# Patient Record
Sex: MALE | Race: OTHER | Employment: FULL TIME | URBAN - METROPOLITAN AREA
[De-identification: names, ages, dates, MRNs, and addresses within clinical notes are randomized per-mention and may not be internally consistent; named-entity substitution may affect disease eponyms.]

---

## 2023-12-24 ENCOUNTER — APPOINTMENT (EMERGENCY)
Dept: CT IMAGING | Facility: HOSPITAL | Age: 58
End: 2023-12-24

## 2023-12-24 ENCOUNTER — HOSPITAL ENCOUNTER (EMERGENCY)
Facility: HOSPITAL | Age: 58
End: 2023-12-25
Attending: EMERGENCY MEDICINE

## 2023-12-24 ENCOUNTER — APPOINTMENT (EMERGENCY)
Dept: RADIOLOGY | Facility: HOSPITAL | Age: 58
End: 2023-12-24

## 2023-12-24 DIAGNOSIS — E87.29 HIGH ANION GAP METABOLIC ACIDOSIS: ICD-10-CM

## 2023-12-24 DIAGNOSIS — E11.9 NEWLY DIAGNOSED DIABETES (HCC): ICD-10-CM

## 2023-12-24 DIAGNOSIS — N17.9 AKI (ACUTE KIDNEY INJURY) (HCC): ICD-10-CM

## 2023-12-24 DIAGNOSIS — E11.10 DKA (DIABETIC KETOACIDOSIS) (HCC): Primary | ICD-10-CM

## 2023-12-24 LAB
ALBUMIN SERPL BCP-MCNC: 4.3 G/DL (ref 3.5–5)
ALP SERPL-CCNC: 122 U/L (ref 34–104)
ALT SERPL W P-5'-P-CCNC: 24 U/L (ref 7–52)
ANION GAP SERPL CALCULATED.3IONS-SCNC: 27 MMOL/L
APTT PPP: 27 SECONDS (ref 23–37)
ARTERIAL PATENCY WRIST A: YES
AST SERPL W P-5'-P-CCNC: 9 U/L (ref 13–39)
BACTERIA UR QL AUTO: ABNORMAL /HPF
BASE EXCESS BLDA CALC-SCNC: -23.3 MMOL/L
BASOPHILS # BLD AUTO: 0.05 THOUSANDS/ÂΜL (ref 0–0.1)
BASOPHILS NFR BLD AUTO: 0 % (ref 0–1)
BETA-HYDROXYBUTYRATE: 6.1 MMOL/L
BILIRUB SERPL-MCNC: 0.75 MG/DL (ref 0.2–1)
BILIRUB UR QL STRIP: NEGATIVE
BNP SERPL-MCNC: 30 PG/ML (ref 0–100)
BUN SERPL-MCNC: 44 MG/DL (ref 5–25)
CALCIUM SERPL-MCNC: 9.5 MG/DL (ref 8.4–10.2)
CARDIAC TROPONIN I PNL SERPL HS: 6 NG/L
CHLORIDE SERPL-SCNC: 102 MMOL/L (ref 96–108)
CLARITY UR: CLEAR
CO2 SERPL-SCNC: 6 MMOL/L (ref 21–32)
COLOR UR: YELLOW
CREAT SERPL-MCNC: 1.99 MG/DL (ref 0.6–1.3)
EOSINOPHIL # BLD AUTO: 0 THOUSAND/ÂΜL (ref 0–0.61)
EOSINOPHIL NFR BLD AUTO: 0 % (ref 0–6)
ERYTHROCYTE [DISTWIDTH] IN BLOOD BY AUTOMATED COUNT: 14.2 % (ref 11.6–15.1)
FLUAV RNA RESP QL NAA+PROBE: NEGATIVE
FLUBV RNA RESP QL NAA+PROBE: NEGATIVE
GFR SERPL CREATININE-BSD FRML MDRD: 36 ML/MIN/1.73SQ M
GLUCOSE SERPL-MCNC: 665 MG/DL (ref 65–140)
GLUCOSE SERPL-MCNC: >500 MG/DL (ref 65–140)
GLUCOSE SERPL-MCNC: >500 MG/DL (ref 65–140)
GLUCOSE UR STRIP-MCNC: ABNORMAL MG/DL
HCO3 BLDA-SCNC: 3.5 MMOL/L (ref 22–28)
HCT VFR BLD AUTO: 56 % (ref 36.5–49.3)
HGB BLD-MCNC: 18.8 G/DL (ref 12–17)
HGB UR QL STRIP.AUTO: ABNORMAL
HYALINE CASTS #/AREA URNS LPF: ABNORMAL /LPF
IMM GRANULOCYTES # BLD AUTO: 0.11 THOUSAND/UL (ref 0–0.2)
IMM GRANULOCYTES NFR BLD AUTO: 1 % (ref 0–2)
INR PPP: 1.12 (ref 0.84–1.19)
KETONES UR STRIP-MCNC: ABNORMAL MG/DL
LACTATE SERPL-SCNC: 2.8 MMOL/L (ref 0.5–2)
LEUKOCYTE ESTERASE UR QL STRIP: NEGATIVE
LYMPHOCYTES # BLD AUTO: 1.24 THOUSANDS/ÂΜL (ref 0.6–4.47)
LYMPHOCYTES NFR BLD AUTO: 7 % (ref 14–44)
MAGNESIUM SERPL-MCNC: 2.9 MG/DL (ref 1.9–2.7)
MCH RBC QN AUTO: 30.3 PG (ref 26.8–34.3)
MCHC RBC AUTO-ENTMCNC: 33.6 G/DL (ref 31.4–37.4)
MCV RBC AUTO: 90 FL (ref 82–98)
MONOCYTES # BLD AUTO: 1.35 THOUSAND/ÂΜL (ref 0.17–1.22)
MONOCYTES NFR BLD AUTO: 8 % (ref 4–12)
NEUTROPHILS # BLD AUTO: 14.98 THOUSANDS/ÂΜL (ref 1.85–7.62)
NEUTS SEG NFR BLD AUTO: 84 % (ref 43–75)
NITRITE UR QL STRIP: NEGATIVE
NON VENT ROOM AIR: 21 %
NON-SQ EPI CELLS URNS QL MICRO: ABNORMAL /HPF
NRBC BLD AUTO-RTO: 0 /100 WBCS
O2 CT BLDA-SCNC: 24.7 ML/DL (ref 16–23)
OXYHGB MFR BLDA: 96.8 % (ref 94–97)
PCO2 BLDA: 11.2 MM HG (ref 36–44)
PH BLDA: 7.11 [PH] (ref 7.35–7.45)
PH UR STRIP.AUTO: 6 [PH]
PLATELET # BLD AUTO: 243 THOUSANDS/UL (ref 149–390)
PMV BLD AUTO: 12.3 FL (ref 8.9–12.7)
PO2 BLDA: 109 MM HG (ref 75–129)
POTASSIUM SERPL-SCNC: 4.7 MMOL/L (ref 3.5–5.3)
PROCALCITONIN SERPL-MCNC: 0.15 NG/ML
PROT SERPL-MCNC: 8.9 G/DL (ref 6.4–8.4)
PROT UR STRIP-MCNC: ABNORMAL MG/DL
PROTHROMBIN TIME: 14.3 SECONDS (ref 11.6–14.5)
RBC # BLD AUTO: 6.21 MILLION/UL (ref 3.88–5.62)
RBC #/AREA URNS AUTO: ABNORMAL /HPF
RSV RNA RESP QL NAA+PROBE: NEGATIVE
SARS-COV-2 RNA RESP QL NAA+PROBE: NEGATIVE
SODIUM SERPL-SCNC: 135 MMOL/L (ref 135–147)
SP GR UR STRIP.AUTO: 1.02 (ref 1–1.03)
SPECIMEN SOURCE: ABNORMAL
UROBILINOGEN UR QL STRIP.AUTO: 0.2 E.U./DL
WBC # BLD AUTO: 17.73 THOUSAND/UL (ref 4.31–10.16)
WBC #/AREA URNS AUTO: ABNORMAL /HPF

## 2023-12-24 PROCEDURE — 96365 THER/PROPH/DIAG IV INF INIT: CPT

## 2023-12-24 PROCEDURE — 84484 ASSAY OF TROPONIN QUANT: CPT | Performed by: EMERGENCY MEDICINE

## 2023-12-24 PROCEDURE — 84300 ASSAY OF URINE SODIUM: CPT | Performed by: EMERGENCY MEDICINE

## 2023-12-24 PROCEDURE — 83880 ASSAY OF NATRIURETIC PEPTIDE: CPT | Performed by: EMERGENCY MEDICINE

## 2023-12-24 PROCEDURE — 83935 ASSAY OF URINE OSMOLALITY: CPT | Performed by: EMERGENCY MEDICINE

## 2023-12-24 PROCEDURE — 36415 COLL VENOUS BLD VENIPUNCTURE: CPT | Performed by: EMERGENCY MEDICINE

## 2023-12-24 PROCEDURE — 85610 PROTHROMBIN TIME: CPT | Performed by: EMERGENCY MEDICINE

## 2023-12-24 PROCEDURE — 83735 ASSAY OF MAGNESIUM: CPT | Performed by: EMERGENCY MEDICINE

## 2023-12-24 PROCEDURE — 85730 THROMBOPLASTIN TIME PARTIAL: CPT | Performed by: EMERGENCY MEDICINE

## 2023-12-24 PROCEDURE — 99291 CRITICAL CARE FIRST HOUR: CPT | Performed by: EMERGENCY MEDICINE

## 2023-12-24 PROCEDURE — 96366 THER/PROPH/DIAG IV INF ADDON: CPT

## 2023-12-24 PROCEDURE — 36600 WITHDRAWAL OF ARTERIAL BLOOD: CPT

## 2023-12-24 PROCEDURE — 81001 URINALYSIS AUTO W/SCOPE: CPT | Performed by: EMERGENCY MEDICINE

## 2023-12-24 PROCEDURE — 81003 URINALYSIS AUTO W/O SCOPE: CPT | Performed by: EMERGENCY MEDICINE

## 2023-12-24 PROCEDURE — 0241U HB NFCT DS VIR RESP RNA 4 TRGT: CPT | Performed by: EMERGENCY MEDICINE

## 2023-12-24 PROCEDURE — 82948 REAGENT STRIP/BLOOD GLUCOSE: CPT

## 2023-12-24 PROCEDURE — 71045 X-RAY EXAM CHEST 1 VIEW: CPT

## 2023-12-24 PROCEDURE — 83930 ASSAY OF BLOOD OSMOLALITY: CPT | Performed by: EMERGENCY MEDICINE

## 2023-12-24 PROCEDURE — 96368 THER/DIAG CONCURRENT INF: CPT

## 2023-12-24 PROCEDURE — 80053 COMPREHEN METABOLIC PANEL: CPT | Performed by: EMERGENCY MEDICINE

## 2023-12-24 PROCEDURE — 85025 COMPLETE CBC W/AUTO DIFF WBC: CPT | Performed by: EMERGENCY MEDICINE

## 2023-12-24 PROCEDURE — 93005 ELECTROCARDIOGRAM TRACING: CPT

## 2023-12-24 PROCEDURE — 74177 CT ABD & PELVIS W/CONTRAST: CPT

## 2023-12-24 PROCEDURE — 83605 ASSAY OF LACTIC ACID: CPT | Performed by: EMERGENCY MEDICINE

## 2023-12-24 PROCEDURE — 82010 KETONE BODYS QUAN: CPT | Performed by: EMERGENCY MEDICINE

## 2023-12-24 PROCEDURE — 96361 HYDRATE IV INFUSION ADD-ON: CPT

## 2023-12-24 PROCEDURE — 99285 EMERGENCY DEPT VISIT HI MDM: CPT

## 2023-12-24 PROCEDURE — 82805 BLOOD GASES W/O2 SATURATION: CPT | Performed by: EMERGENCY MEDICINE

## 2023-12-24 PROCEDURE — G1004 CDSM NDSC: HCPCS

## 2023-12-24 PROCEDURE — 84145 PROCALCITONIN (PCT): CPT | Performed by: EMERGENCY MEDICINE

## 2023-12-24 RX ORDER — SODIUM CHLORIDE 9 MG/ML
200 INJECTION, SOLUTION INTRAVENOUS CONTINUOUS
Status: DISCONTINUED | OUTPATIENT
Start: 2023-12-24 | End: 2023-12-25 | Stop reason: HOSPADM

## 2023-12-24 RX ORDER — POTASSIUM CHLORIDE 14.9 MG/ML
20 INJECTION INTRAVENOUS
Qty: 200 ML | Refills: 0 | Status: DISCONTINUED | OUTPATIENT
Start: 2023-12-24 | End: 2023-12-25 | Stop reason: HOSPADM

## 2023-12-24 RX ADMIN — IOHEXOL 100 ML: 350 INJECTION, SOLUTION INTRAVENOUS at 23:09

## 2023-12-24 RX ADMIN — SODIUM CHLORIDE 1000 ML: 0.9 INJECTION, SOLUTION INTRAVENOUS at 22:04

## 2023-12-24 RX ADMIN — SODIUM CHLORIDE 54 ML: 9 INJECTION, SOLUTION INTRAVENOUS at 23:28

## 2023-12-24 RX ADMIN — SODIUM CHLORIDE 1000 ML: 0.9 INJECTION, SOLUTION INTRAVENOUS at 22:01

## 2023-12-24 RX ADMIN — INSULIN HUMAN 10 UNITS: 100 INJECTION, SOLUTION PARENTERAL at 22:59

## 2023-12-24 RX ADMIN — POTASSIUM CHLORIDE 20 MEQ: 14.9 INJECTION, SOLUTION INTRAVENOUS at 22:59

## 2023-12-24 RX ADMIN — SODIUM CHLORIDE 200 ML/HR: 0.9 INJECTION, SOLUTION INTRAVENOUS at 23:46

## 2023-12-24 RX ADMIN — SODIUM CHLORIDE 1000 ML: 0.9 INJECTION, SOLUTION INTRAVENOUS at 22:00

## 2023-12-24 RX ADMIN — SODIUM CHLORIDE 10.2 UNITS/HR: 9 INJECTION, SOLUTION INTRAVENOUS at 22:59

## 2023-12-25 ENCOUNTER — APPOINTMENT (INPATIENT)
Dept: RADIOLOGY | Facility: HOSPITAL | Age: 58
DRG: 638 | End: 2023-12-25

## 2023-12-25 ENCOUNTER — HOSPITAL ENCOUNTER (INPATIENT)
Facility: HOSPITAL | Age: 58
LOS: 4 days | Discharge: HOME | DRG: 638 | End: 2023-12-29
Attending: INTERNAL MEDICINE | Admitting: INTERNAL MEDICINE
Payer: COMMERCIAL

## 2023-12-25 VITALS
TEMPERATURE: 97.4 F | BODY MASS INDEX: 37.39 KG/M2 | OXYGEN SATURATION: 99 % | HEIGHT: 65 IN | DIASTOLIC BLOOD PRESSURE: 59 MMHG | SYSTOLIC BLOOD PRESSURE: 113 MMHG | HEART RATE: 90 BPM | RESPIRATION RATE: 28 BRPM | WEIGHT: 224.43 LBS

## 2023-12-25 DIAGNOSIS — E11.10 DKA (DIABETIC KETOACIDOSIS) (HCC): Primary | ICD-10-CM

## 2023-12-25 PROBLEM — R79.89 ELEVATED SERUM CREATININE: Status: ACTIVE | Noted: 2023-12-25

## 2023-12-25 PROBLEM — K59.00 CONSTIPATION: Status: ACTIVE | Noted: 2023-12-25

## 2023-12-25 PROBLEM — R93.89 ABNORMAL FINDING ON CT SCAN: Status: RESOLVED | Noted: 2023-12-25 | Resolved: 2023-12-25

## 2023-12-25 PROBLEM — R93.89 ABNORMAL FINDING ON CT SCAN: Status: ACTIVE | Noted: 2023-12-25

## 2023-12-25 PROBLEM — N17.9 AKI (ACUTE KIDNEY INJURY) (HCC): Status: ACTIVE | Noted: 2023-12-25

## 2023-12-25 LAB
2HR DELTA HS TROPONIN: -1 NG/L
ANION GAP SERPL CALCULATED.3IONS-SCNC: 14 MMOL/L
ANION GAP SERPL CALCULATED.3IONS-SCNC: 19 MMOL/L
ANION GAP SERPL CALCULATED.3IONS-SCNC: 22 MMOL/L
ANION GAP SERPL CALCULATED.3IONS-SCNC: 6 MMOL/L
ANION GAP SERPL CALCULATED.3IONS-SCNC: 6 MMOL/L
ANION GAP SERPL CALCULATED.3IONS-SCNC: 8 MMOL/L
BASE EX.OXY STD BLDV CALC-SCNC: 86.2 % (ref 60–80)
BASE EXCESS BLDV CALC-SCNC: -22.6 MMOL/L
BASOPHILS # BLD AUTO: 0.02 THOUSANDS/ÂΜL (ref 0–0.1)
BASOPHILS NFR BLD AUTO: 0 % (ref 0–1)
BUN SERPL-MCNC: 17 MG/DL (ref 5–25)
BUN SERPL-MCNC: 20 MG/DL (ref 5–25)
BUN SERPL-MCNC: 25 MG/DL (ref 5–25)
BUN SERPL-MCNC: 30 MG/DL (ref 5–25)
BUN SERPL-MCNC: 35 MG/DL (ref 5–25)
BUN SERPL-MCNC: 37 MG/DL (ref 5–25)
CA-I BLD-SCNC: 1.19 MMOL/L (ref 1.12–1.32)
CALCIUM SERPL-MCNC: 7 MG/DL (ref 8.4–10.2)
CALCIUM SERPL-MCNC: 7.2 MG/DL (ref 8.4–10.2)
CALCIUM SERPL-MCNC: 7.5 MG/DL (ref 8.4–10.2)
CALCIUM SERPL-MCNC: 7.6 MG/DL (ref 8.4–10.2)
CALCIUM SERPL-MCNC: 7.7 MG/DL (ref 8.4–10.2)
CALCIUM SERPL-MCNC: 7.9 MG/DL (ref 8.4–10.2)
CARDIAC TROPONIN I PNL SERPL HS: 5 NG/L
CHLORIDE SERPL-SCNC: 112 MMOL/L (ref 96–108)
CHLORIDE SERPL-SCNC: 119 MMOL/L (ref 96–108)
CHLORIDE SERPL-SCNC: 121 MMOL/L (ref 96–108)
CHLORIDE SERPL-SCNC: 122 MMOL/L (ref 96–108)
CHLORIDE SERPL-SCNC: 124 MMOL/L (ref 96–108)
CHLORIDE SERPL-SCNC: 124 MMOL/L (ref 96–108)
CO2 SERPL-SCNC: 11 MMOL/L (ref 21–32)
CO2 SERPL-SCNC: 16 MMOL/L (ref 21–32)
CO2 SERPL-SCNC: 18 MMOL/L (ref 21–32)
CO2 SERPL-SCNC: 18 MMOL/L (ref 21–32)
CO2 SERPL-SCNC: 5 MMOL/L (ref 21–32)
CO2 SERPL-SCNC: 6 MMOL/L (ref 21–32)
CREAT SERPL-MCNC: 0.69 MG/DL (ref 0.6–1.3)
CREAT SERPL-MCNC: 0.8 MG/DL (ref 0.6–1.3)
CREAT SERPL-MCNC: 0.92 MG/DL (ref 0.6–1.3)
CREAT SERPL-MCNC: 1.2 MG/DL (ref 0.6–1.3)
CREAT SERPL-MCNC: 1.41 MG/DL (ref 0.6–1.3)
CREAT SERPL-MCNC: 1.55 MG/DL (ref 0.6–1.3)
EOSINOPHIL # BLD AUTO: 0 THOUSAND/ÂΜL (ref 0–0.61)
EOSINOPHIL NFR BLD AUTO: 0 % (ref 0–6)
ERYTHROCYTE [DISTWIDTH] IN BLOOD BY AUTOMATED COUNT: 14 % (ref 11.6–15.1)
ERYTHROCYTE [DISTWIDTH] IN BLOOD BY AUTOMATED COUNT: 14.2 % (ref 11.6–15.1)
GFR SERPL CREATININE-BSD FRML MDRD: 105 ML/MIN/1.73SQ M
GFR SERPL CREATININE-BSD FRML MDRD: 48 ML/MIN/1.73SQ M
GFR SERPL CREATININE-BSD FRML MDRD: 54 ML/MIN/1.73SQ M
GFR SERPL CREATININE-BSD FRML MDRD: 66 ML/MIN/1.73SQ M
GFR SERPL CREATININE-BSD FRML MDRD: 91 ML/MIN/1.73SQ M
GFR SERPL CREATININE-BSD FRML MDRD: 99 ML/MIN/1.73SQ M
GLUCOSE SERPL-MCNC: 100 MG/DL (ref 65–140)
GLUCOSE SERPL-MCNC: 102 MG/DL (ref 65–140)
GLUCOSE SERPL-MCNC: 106 MG/DL (ref 65–140)
GLUCOSE SERPL-MCNC: 111 MG/DL (ref 65–140)
GLUCOSE SERPL-MCNC: 127 MG/DL (ref 65–140)
GLUCOSE SERPL-MCNC: 157 MG/DL (ref 65–140)
GLUCOSE SERPL-MCNC: 177 MG/DL (ref 65–140)
GLUCOSE SERPL-MCNC: 190 MG/DL (ref 65–140)
GLUCOSE SERPL-MCNC: 204 MG/DL (ref 65–140)
GLUCOSE SERPL-MCNC: 209 MG/DL (ref 65–140)
GLUCOSE SERPL-MCNC: 212 MG/DL (ref 65–140)
GLUCOSE SERPL-MCNC: 222 MG/DL (ref 65–140)
GLUCOSE SERPL-MCNC: 225 MG/DL (ref 65–140)
GLUCOSE SERPL-MCNC: 226 MG/DL (ref 65–140)
GLUCOSE SERPL-MCNC: 230 MG/DL (ref 65–140)
GLUCOSE SERPL-MCNC: 256 MG/DL (ref 65–140)
GLUCOSE SERPL-MCNC: 268 MG/DL (ref 65–140)
GLUCOSE SERPL-MCNC: 315 MG/DL (ref 65–140)
GLUCOSE SERPL-MCNC: 364 MG/DL (ref 65–140)
GLUCOSE SERPL-MCNC: 371 MG/DL (ref 65–140)
GLUCOSE SERPL-MCNC: 389 MG/DL (ref 65–140)
GLUCOSE SERPL-MCNC: 449 MG/DL (ref 65–140)
GLUCOSE SERPL-MCNC: 455 MG/DL (ref 65–140)
GLUCOSE SERPL-MCNC: 68 MG/DL (ref 65–140)
GLUCOSE SERPL-MCNC: 74 MG/DL (ref 65–140)
GLUCOSE SERPL-MCNC: 96 MG/DL (ref 65–140)
GLUCOSE SERPL-MCNC: 97 MG/DL (ref 65–140)
GLUCOSE SERPL-MCNC: 98 MG/DL (ref 65–140)
GLUCOSE SERPL-MCNC: 99 MG/DL (ref 65–140)
HCO3 BLDV-SCNC: 6.2 MMOL/L (ref 24–30)
HCT VFR BLD AUTO: 46.2 % (ref 36.5–49.3)
HCT VFR BLD AUTO: 47.9 % (ref 36.5–49.3)
HGB BLD-MCNC: 15.5 G/DL (ref 12–17)
HGB BLD-MCNC: 16.2 G/DL (ref 12–17)
IMM GRANULOCYTES # BLD AUTO: 0.07 THOUSAND/UL (ref 0–0.2)
IMM GRANULOCYTES NFR BLD AUTO: 1 % (ref 0–2)
LACTATE SERPL-SCNC: 1.8 MMOL/L (ref 0.5–2)
LACTATE SERPL-SCNC: 2.4 MMOL/L (ref 0.5–2)
LYMPHOCYTES # BLD AUTO: 1.27 THOUSANDS/ÂΜL (ref 0.6–4.47)
LYMPHOCYTES NFR BLD AUTO: 11 % (ref 14–44)
MAGNESIUM SERPL-MCNC: 1.9 MG/DL (ref 1.9–2.7)
MAGNESIUM SERPL-MCNC: 2.2 MG/DL (ref 1.9–2.7)
MAGNESIUM SERPL-MCNC: 2.4 MG/DL (ref 1.9–2.7)
MCH RBC QN AUTO: 30 PG (ref 26.8–34.3)
MCH RBC QN AUTO: 30.4 PG (ref 26.8–34.3)
MCHC RBC AUTO-ENTMCNC: 33.5 G/DL (ref 31.4–37.4)
MCHC RBC AUTO-ENTMCNC: 33.8 G/DL (ref 31.4–37.4)
MCV RBC AUTO: 90 FL (ref 82–98)
MCV RBC AUTO: 90 FL (ref 82–98)
MONOCYTES # BLD AUTO: 0.65 THOUSAND/ÂΜL (ref 0.17–1.22)
MONOCYTES NFR BLD AUTO: 5 % (ref 4–12)
NEUTROPHILS # BLD AUTO: 10.14 THOUSANDS/ÂΜL (ref 1.85–7.62)
NEUTS SEG NFR BLD AUTO: 83 % (ref 43–75)
NRBC BLD AUTO-RTO: 0 /100 WBCS
O2 CT BLDV-SCNC: 20.2 ML/DL
OSMOLALITY UR/SERPL-RTO: 367 MMOL/KG (ref 282–298)
OSMOLALITY UR: 640 MMOL/KG
PCO2 BLDV: 22.7 MM HG (ref 42–50)
PH BLDV: 7.05 [PH] (ref 7.3–7.4)
PHOSPHATE SERPL-MCNC: 1.2 MG/DL (ref 2.7–4.5)
PHOSPHATE SERPL-MCNC: 1.3 MG/DL (ref 2.7–4.5)
PHOSPHATE SERPL-MCNC: 1.3 MG/DL (ref 2.7–4.5)
PHOSPHATE SERPL-MCNC: 1.9 MG/DL (ref 2.7–4.5)
PHOSPHATE SERPL-MCNC: <1 MG/DL (ref 2.7–4.5)
PLATELET # BLD AUTO: 160 THOUSANDS/UL (ref 149–390)
PLATELET # BLD AUTO: 171 THOUSANDS/UL (ref 149–390)
PMV BLD AUTO: 12 FL (ref 8.9–12.7)
PMV BLD AUTO: 12.1 FL (ref 8.9–12.7)
PO2 BLDV: 61.2 MM HG (ref 35–45)
POTASSIUM SERPL-SCNC: 2.8 MMOL/L (ref 3.5–5.3)
POTASSIUM SERPL-SCNC: 2.9 MMOL/L (ref 3.5–5.3)
POTASSIUM SERPL-SCNC: 3.4 MMOL/L (ref 3.5–5.3)
POTASSIUM SERPL-SCNC: 3.5 MMOL/L (ref 3.5–5.3)
POTASSIUM SERPL-SCNC: 3.6 MMOL/L (ref 3.5–5.3)
POTASSIUM SERPL-SCNC: 3.8 MMOL/L (ref 3.5–5.3)
RBC # BLD AUTO: 5.16 MILLION/UL (ref 3.88–5.62)
RBC # BLD AUTO: 5.33 MILLION/UL (ref 3.88–5.62)
SODIUM 24H UR-SCNC: 49 MOL/L
SODIUM SERPL-SCNC: 139 MMOL/L (ref 135–147)
SODIUM SERPL-SCNC: 144 MMOL/L (ref 135–147)
SODIUM SERPL-SCNC: 146 MMOL/L (ref 135–147)
SODIUM SERPL-SCNC: 146 MMOL/L (ref 135–147)
SODIUM SERPL-SCNC: 148 MMOL/L (ref 135–147)
SODIUM SERPL-SCNC: 148 MMOL/L (ref 135–147)
WBC # BLD AUTO: 12.15 THOUSAND/UL (ref 4.31–10.16)
WBC # BLD AUTO: 15.04 THOUSAND/UL (ref 4.31–10.16)

## 2023-12-25 PROCEDURE — 84100 ASSAY OF PHOSPHORUS: CPT | Performed by: PHYSICIAN ASSISTANT

## 2023-12-25 PROCEDURE — 99223 1ST HOSP IP/OBS HIGH 75: CPT | Performed by: INTERNAL MEDICINE

## 2023-12-25 PROCEDURE — 82948 REAGENT STRIP/BLOOD GLUCOSE: CPT

## 2023-12-25 PROCEDURE — 83605 ASSAY OF LACTIC ACID: CPT | Performed by: EMERGENCY MEDICINE

## 2023-12-25 PROCEDURE — 82330 ASSAY OF CALCIUM: CPT | Performed by: PHYSICIAN ASSISTANT

## 2023-12-25 PROCEDURE — 80048 BASIC METABOLIC PNL TOTAL CA: CPT | Performed by: PHYSICIAN ASSISTANT

## 2023-12-25 PROCEDURE — 96367 TX/PROPH/DG ADDL SEQ IV INF: CPT

## 2023-12-25 PROCEDURE — 36415 COLL VENOUS BLD VENIPUNCTURE: CPT | Performed by: EMERGENCY MEDICINE

## 2023-12-25 PROCEDURE — 80048 BASIC METABOLIC PNL TOTAL CA: CPT | Performed by: EMERGENCY MEDICINE

## 2023-12-25 PROCEDURE — 99255 IP/OBS CONSLTJ NEW/EST HI 80: CPT | Performed by: INTERNAL MEDICINE

## 2023-12-25 PROCEDURE — 83036 HEMOGLOBIN GLYCOSYLATED A1C: CPT | Performed by: PHYSICIAN ASSISTANT

## 2023-12-25 PROCEDURE — 85025 COMPLETE CBC W/AUTO DIFF WBC: CPT | Performed by: PHYSICIAN ASSISTANT

## 2023-12-25 PROCEDURE — 85027 COMPLETE CBC AUTOMATED: CPT | Performed by: PHYSICIAN ASSISTANT

## 2023-12-25 PROCEDURE — 83605 ASSAY OF LACTIC ACID: CPT | Performed by: PHYSICIAN ASSISTANT

## 2023-12-25 PROCEDURE — 71045 X-RAY EXAM CHEST 1 VIEW: CPT

## 2023-12-25 PROCEDURE — 84484 ASSAY OF TROPONIN QUANT: CPT | Performed by: EMERGENCY MEDICINE

## 2023-12-25 PROCEDURE — 82805 BLOOD GASES W/O2 SATURATION: CPT | Performed by: PHYSICIAN ASSISTANT

## 2023-12-25 PROCEDURE — 87040 BLOOD CULTURE FOR BACTERIA: CPT | Performed by: PHYSICIAN ASSISTANT

## 2023-12-25 PROCEDURE — 83735 ASSAY OF MAGNESIUM: CPT | Performed by: PHYSICIAN ASSISTANT

## 2023-12-25 RX ORDER — INSULIN LISPRO 100 [IU]/ML
1-6 INJECTION, SOLUTION INTRAVENOUS; SUBCUTANEOUS EVERY 6 HOURS SCHEDULED
Status: DISCONTINUED | OUTPATIENT
Start: 2023-12-25 | End: 2023-12-26

## 2023-12-25 RX ORDER — SODIUM CHLORIDE, SODIUM GLUCONATE, SODIUM ACETATE, POTASSIUM CHLORIDE, MAGNESIUM CHLORIDE, SODIUM PHOSPHATE, DIBASIC, AND POTASSIUM PHOSPHATE .53; .5; .37; .037; .03; .012; .00082 G/100ML; G/100ML; G/100ML; G/100ML; G/100ML; G/100ML; G/100ML
250 INJECTION, SOLUTION INTRAVENOUS CONTINUOUS
Status: DISPENSED | OUTPATIENT
Start: 2023-12-25 | End: 2023-12-25

## 2023-12-25 RX ORDER — INSULIN LISPRO 100 [IU]/ML
10 INJECTION, SOLUTION INTRAVENOUS; SUBCUTANEOUS
Status: DISCONTINUED | OUTPATIENT
Start: 2023-12-25 | End: 2023-12-26

## 2023-12-25 RX ORDER — DEXTROSE, SODIUM CHLORIDE, SODIUM LACTATE, POTASSIUM CHLORIDE, AND CALCIUM CHLORIDE 5; .6; .31; .03; .02 G/100ML; G/100ML; G/100ML; G/100ML; G/100ML
250 INJECTION, SOLUTION INTRAVENOUS CONTINUOUS
Status: DISCONTINUED | OUTPATIENT
Start: 2023-12-25 | End: 2023-12-25

## 2023-12-25 RX ORDER — SODIUM CHLORIDE, SODIUM GLUCONATE, SODIUM ACETATE, POTASSIUM CHLORIDE, MAGNESIUM CHLORIDE, SODIUM PHOSPHATE, DIBASIC, AND POTASSIUM PHOSPHATE .53; .5; .37; .037; .03; .012; .00082 G/100ML; G/100ML; G/100ML; G/100ML; G/100ML; G/100ML; G/100ML
500 INJECTION, SOLUTION INTRAVENOUS CONTINUOUS
Status: DISPENSED | OUTPATIENT
Start: 2023-12-25 | End: 2023-12-25

## 2023-12-25 RX ORDER — POTASSIUM CHLORIDE 14.9 MG/ML
20 INJECTION INTRAVENOUS
Status: COMPLETED | OUTPATIENT
Start: 2023-12-25 | End: 2023-12-26

## 2023-12-25 RX ORDER — HEPARIN SODIUM 5000 [USP'U]/ML
5000 INJECTION, SOLUTION INTRAVENOUS; SUBCUTANEOUS EVERY 8 HOURS SCHEDULED
Status: DISCONTINUED | OUTPATIENT
Start: 2023-12-25 | End: 2023-12-29 | Stop reason: HOSPADM

## 2023-12-25 RX ORDER — DEXTROSE MONOHYDRATE 25 G/50ML
25 INJECTION, SOLUTION INTRAVENOUS ONCE
Status: COMPLETED | OUTPATIENT
Start: 2023-12-25 | End: 2023-12-25

## 2023-12-25 RX ORDER — ACETAMINOPHEN 325 MG/1
650 TABLET ORAL EVERY 6 HOURS PRN
Status: DISCONTINUED | OUTPATIENT
Start: 2023-12-25 | End: 2023-12-29 | Stop reason: HOSPADM

## 2023-12-25 RX ORDER — SENNOSIDES 8.6 MG
1 TABLET ORAL
Status: DISCONTINUED | OUTPATIENT
Start: 2023-12-25 | End: 2023-12-29 | Stop reason: HOSPADM

## 2023-12-25 RX ORDER — PIPERACILLIN SODIUM, TAZOBACTAM SODIUM 4; .5 G/20ML; G/20ML
INJECTION, POWDER, LYOPHILIZED, FOR SOLUTION INTRAVENOUS
Status: DISCONTINUED
Start: 2023-12-25 | End: 2023-12-25 | Stop reason: HOSPADM

## 2023-12-25 RX ORDER — MAGNESIUM SULFATE HEPTAHYDRATE 40 MG/ML
2 INJECTION, SOLUTION INTRAVENOUS ONCE
Status: COMPLETED | OUTPATIENT
Start: 2023-12-25 | End: 2023-12-26

## 2023-12-25 RX ORDER — POLYETHYLENE GLYCOL 3350 17 G/17G
17 POWDER, FOR SOLUTION ORAL DAILY PRN
Status: DISCONTINUED | OUTPATIENT
Start: 2023-12-25 | End: 2023-12-29 | Stop reason: HOSPADM

## 2023-12-25 RX ORDER — CHLORHEXIDINE GLUCONATE ORAL RINSE 1.2 MG/ML
15 SOLUTION DENTAL EVERY 12 HOURS SCHEDULED
Status: DISCONTINUED | OUTPATIENT
Start: 2023-12-25 | End: 2023-12-29 | Stop reason: HOSPADM

## 2023-12-25 RX ORDER — POTASSIUM CHLORIDE 14.9 MG/ML
20 INJECTION INTRAVENOUS
Status: COMPLETED | OUTPATIENT
Start: 2023-12-25 | End: 2023-12-25

## 2023-12-25 RX ADMIN — SODIUM CHLORIDE, SODIUM GLUCONATE, SODIUM ACETATE, POTASSIUM CHLORIDE, MAGNESIUM CHLORIDE, SODIUM PHOSPHATE, DIBASIC, AND POTASSIUM PHOSPHATE 500 ML/HR: .53; .5; .37; .037; .03; .012; .00082 INJECTION, SOLUTION INTRAVENOUS at 02:21

## 2023-12-25 RX ADMIN — POTASSIUM CHLORIDE 20 MEQ: 14.9 INJECTION, SOLUTION INTRAVENOUS at 10:08

## 2023-12-25 RX ADMIN — SODIUM CHLORIDE 30 UNITS/HR: 9 INJECTION, SOLUTION INTRAVENOUS at 11:57

## 2023-12-25 RX ADMIN — POTASSIUM PHOSPHATE, MONOBASIC POTASSIUM PHOSPHATE, DIBASIC 30 MMOL: 224; 236 INJECTION, SOLUTION, CONCENTRATE INTRAVENOUS at 09:40

## 2023-12-25 RX ADMIN — POTASSIUM CHLORIDE 20 MEQ: 14.9 INJECTION, SOLUTION INTRAVENOUS at 19:15

## 2023-12-25 RX ADMIN — SODIUM CHLORIDE 30 UNITS/HR: 9 INJECTION, SOLUTION INTRAVENOUS at 08:05

## 2023-12-25 RX ADMIN — INSULIN LISPRO 10 UNITS: 100 INJECTION, SOLUTION INTRAVENOUS; SUBCUTANEOUS at 12:50

## 2023-12-25 RX ADMIN — PIPERACILLIN SODIUM AND TAZOBACTAM SODIUM 4.5 G: 4; .5 INJECTION, POWDER, LYOPHILIZED, FOR SOLUTION INTRAVENOUS at 00:49

## 2023-12-25 RX ADMIN — CHLORHEXIDINE GLUCONATE 15 ML: 1.2 SOLUTION ORAL at 20:20

## 2023-12-25 RX ADMIN — POTASSIUM CHLORIDE 20 MEQ: 14.9 INJECTION, SOLUTION INTRAVENOUS at 16:18

## 2023-12-25 RX ADMIN — ACETAMINOPHEN 650 MG: 325 TABLET, FILM COATED ORAL at 19:59

## 2023-12-25 RX ADMIN — SODIUM CHLORIDE 15 UNITS/HR: 9 INJECTION, SOLUTION INTRAVENOUS at 15:43

## 2023-12-25 RX ADMIN — POTASSIUM CHLORIDE 20 MEQ: 14.9 INJECTION, SOLUTION INTRAVENOUS at 09:04

## 2023-12-25 RX ADMIN — POTASSIUM CHLORIDE 20 MEQ: 14.9 INJECTION, SOLUTION INTRAVENOUS at 11:57

## 2023-12-25 RX ADMIN — MAGNESIUM SULFATE HEPTAHYDRATE 2 G: 40 INJECTION, SOLUTION INTRAVENOUS at 14:11

## 2023-12-25 RX ADMIN — HEPARIN SODIUM 5000 UNITS: 5000 INJECTION INTRAVENOUS; SUBCUTANEOUS at 14:11

## 2023-12-25 RX ADMIN — DEXTROSE, SODIUM CHLORIDE, SODIUM LACTATE, POTASSIUM CHLORIDE, AND CALCIUM CHLORIDE 250 ML/HR: 5; .6; .31; .03; .02 INJECTION, SOLUTION INTRAVENOUS at 06:16

## 2023-12-25 RX ADMIN — POTASSIUM PHOSPHATE, MONOBASIC POTASSIUM PHOSPHATE, DIBASIC 30 MMOL: 224; 236 INJECTION, SOLUTION, CONCENTRATE INTRAVENOUS at 15:17

## 2023-12-25 RX ADMIN — INSULIN LISPRO 2 UNITS: 100 INJECTION, SOLUTION INTRAVENOUS; SUBCUTANEOUS at 23:22

## 2023-12-25 RX ADMIN — DEXTROSE, SODIUM CHLORIDE, SODIUM LACTATE, POTASSIUM CHLORIDE, AND CALCIUM CHLORIDE 250 ML/HR: 5; .6; .31; .03; .02 INJECTION, SOLUTION INTRAVENOUS at 15:14

## 2023-12-25 RX ADMIN — POTASSIUM CHLORIDE 20 MEQ: 14.9 INJECTION, SOLUTION INTRAVENOUS at 20:20

## 2023-12-25 RX ADMIN — DEXTROSE, SODIUM CHLORIDE, SODIUM LACTATE, POTASSIUM CHLORIDE, AND CALCIUM CHLORIDE 250 ML/HR: 5; .6; .31; .03; .02 INJECTION, SOLUTION INTRAVENOUS at 19:15

## 2023-12-25 RX ADMIN — POTASSIUM CHLORIDE 20 MEQ: 14.9 INJECTION, SOLUTION INTRAVENOUS at 15:12

## 2023-12-25 RX ADMIN — SODIUM BICARBONATE 125 ML/HR: 84 INJECTION, SOLUTION INTRAVENOUS at 03:32

## 2023-12-25 RX ADMIN — HEPARIN SODIUM 5000 UNITS: 5000 INJECTION INTRAVENOUS; SUBCUTANEOUS at 05:17

## 2023-12-25 RX ADMIN — POTASSIUM CHLORIDE 20 MEQ: 14.9 INJECTION, SOLUTION INTRAVENOUS at 01:06

## 2023-12-25 RX ADMIN — CHLORHEXIDINE GLUCONATE 15 ML: 1.2 SOLUTION ORAL at 08:05

## 2023-12-25 RX ADMIN — POTASSIUM PHOSPHATE, MONOBASIC AND POTASSIUM PHOSPHATE, DIBASIC 30 MMOL: 224; 236 INJECTION, SOLUTION, CONCENTRATE INTRAVENOUS at 03:55

## 2023-12-25 RX ADMIN — SODIUM CHLORIDE 30 UNITS/HR: 9 INJECTION, SOLUTION INTRAVENOUS at 05:00

## 2023-12-25 RX ADMIN — DEXTROSE, SODIUM CHLORIDE, SODIUM LACTATE, POTASSIUM CHLORIDE, AND CALCIUM CHLORIDE 250 ML/HR: 5; .6; .31; .03; .02 INJECTION, SOLUTION INTRAVENOUS at 11:14

## 2023-12-25 RX ADMIN — POTASSIUM CHLORIDE 20 MEQ: 14.9 INJECTION, SOLUTION INTRAVENOUS at 14:11

## 2023-12-25 RX ADMIN — DEXTROSE MONOHYDRATE 25 ML: 25 INJECTION, SOLUTION INTRAVENOUS at 16:04

## 2023-12-25 NOTE — QUICK NOTE
Patient was seen and examined at bedside this morning. He states that he is feeling much better now compared to when he first came into the hospital. He still endorses having some nausea. He mentions coughing up phlegm with some streaks of blood. He also says that he is constipated, last bowel movement was 4 days ago. No abdominal pain or tenderness on examination. Shortness of breath has resolved. He is visiting from [place] and does not have insurance. We will give prn Tigan due to QTc prolongation 495. We will also order CXR to compare to yesterday. Once his anion gap closes on 2 x BMP, he will be able to eat, drink, and get bowel regimen with Miralax and Senna. Updated wife at bedside.

## 2023-12-25 NOTE — CONSULTS
Endocrinology Consult Note  Praful Quiros 58 y.o. Male MRN: [id number]  Unit/Bed: ICU 12/ICU 12  Encounter: [id number]    CC: Newly-diagnosed diabetes mellitus with DKA     Assessment/Plan   Assessment:  Praful Quiros is a 58-year-old Tongan-speaking male with a past medical history of perforated appendicitis who presents to the ED with DKA and newly diagnosed type 2 diabetes mellitus.    Plan:  Newly-diagnosed diabetes mellitus with DKA   - A1c: Pending  - Outpatient regimen: None  - Continue insulin infusion at this time  - Will add Humalog 10 units 3 times daily to lower hourly insulin requirements  - Most recent BMP was obtained while patient was on a bicarbonate drip, continue to trend at this time per protocol  - Continue fingerstick blood glucose checks  - Avoid hypoglycemia and treat per protocol  - Given his presentation with DKA, would like to obtain antibody panel to rule out type 1 diabetes versus KIARA.  However, since patient is returning to [place] in the first week of January, this will be completed in [place].  - Discharge recommendations to follow pending clinical course; patient's wife was advised that he may benefit from ReliOn insulin given his lack of health insurance coverage and that he will need to check his blood sugars at least 4 times a day at home.  She will use her daughter's glucometer while he is in the  and will purchase a new one in [place].  Patient's wife was also advised to schedule an appointment with his PCP upon return to [place] for further titration of his insulin and antibody testing.  He should also see ophthalmology for a retinopathy screen. This information will be provided in the discharge summary.  - Endocrinology will continue to follow and make further recommendations and changes as appropriate      History of Present Illness   HPI:  Praful Quiros is a male with a past medical history of perforated appendicitis with a complicated postoperative  "lyn who is presented to the [facility] ED with complaints of weakness, fatigue, anxiety, vomiting, and nausea for the past few days and was subsequently transferred to the [facility] for further management of his DKA.  Patient somnolent at the time of my evaluation, therefore history was obtained from his wife at bedside.   [id number] used during this conversation.    Patient is originally from [place] and is visiting family locally with plans to return in the first week of January 2024. Patient's wife denies any known past medical history of diabetes in the patient.  He does not take any medications.  Patient's wife reports that he had been endorsing polydipsia, polyuria, and \"feeling bad\" for the past 1 month with progressively worsening symptoms.  Initially, his family thought that he had bronchitis versus COVID and observed him until yesterday when   He does not check his blood glucose at home and does not follow a diet controlled.  In addition to the polydipsia and polyuria, he endorses blurry vision and paresthesias. He has a family physician in [place] who he sees regularly. He does not have health insurance. He has a family history of type 2 diabetes in his mother and father.  He denies tobacco or illicit drug use but endorses drinking beer twice a week.  He works as a .      Inpatient consult to Endocrinology  Consult performed by: Oly Wing DO  Consult ordered by: Alfredo Saini MD        Review of Systems   Constitutional:  Positive for fatigue. Negative for appetite change, chills, fever and unexpected weight change.   HENT:  Negative for ear pain and sore throat.    Eyes:  Positive for visual disturbance. Negative for pain.   Respiratory:  Negative for cough and shortness of breath.    Cardiovascular:  Negative for chest pain and palpitations.   Gastrointestinal:  Positive for constipation, nausea and vomiting. Negative for abdominal pain and diarrhea. "   Endocrine: Positive for polydipsia and polyuria. Negative for polyphagia.   Genitourinary:  Negative for dysuria and hematuria.   Musculoskeletal:  Negative for arthralgias and back pain.   Skin:  Negative for color change and rash.   Neurological:  Positive for weakness and numbness. Negative for seizures and syncope.   Psychiatric/Behavioral:  The patient is nervous/anxious.    All other systems reviewed and are negative.      Historical Information   History reviewed. No pertinent past medical history.  Past Surgical History:   Procedure Laterality Date    ABDOMINAL SURGERY      APPENDECTOMY       Social History   Social History     Substance and Sexual Activity   Alcohol Use Yes    Comment: social     Social History     Substance and Sexual Activity   Drug Use Not Currently     Social History     Tobacco Use   Smoking Status Never    Passive exposure: Never   Smokeless Tobacco Never     Family History: History reviewed. No pertinent family history.    Meds/Allergies   Current Facility-Administered Medications   Medication Dose Route Frequency Provider Last Rate Last Admin    chlorhexidine (PERIDEX) 0.12 % oral rinse 15 mL  15 mL Mouth/Throat Q12H MELO Fang PA-C   15 mL at 12/25/23 0805    dextrose 5 % in lactated Ringer's infusion  250 mL/hr Intravenous Continuous Laxmi Fang PA-C 250 mL/hr at 12/25/23 1114 250 mL/hr at 12/25/23 1114    heparin (porcine) subcutaneous injection 5,000 Units  5,000 Units Subcutaneous Q8H [facility] Laxmi Fang PA-C   5,000 Units at 12/25/23 0517    insulin lispro (HumaLOG) 100 units/mL subcutaneous injection 10 Units  10 Units Subcutaneous TID With Meals Oly Mecca, DO        insulin regular (HumuLIN R,NovoLIN R) 1 Units/mL in sodium chloride 0.9 % 100 mL infusion  0.1-30 Units/hr Intravenous Continuous Laxmi Fang PA-C 30 mL/hr at 12/25/23 1157 30 Units/hr at 12/25/23 1157    multi-electrolyte (ISOLYTE-S PH 7.4 equivalent) IV solution  250 mL/hr  "Intravenous Continuous Laxmi Fang PA-C   Held at 12/25/23 0600    polyethylene glycol (MIRALAX) packet 17 g  17 g Oral Daily PRN Shreyan Harper, DO        potassium chloride 20 mEq IVPB (premix)  20 mEq Intravenous Q1H Alfred Dent, CRNP   20 mEq at 12/25/23 1157    potassium phosphates 30 mmol in sodium chloride 0.9 % 250 mL infusion  30 mmol Intravenous Once Alfred Dent, CRNP 41.7 mL/hr at 12/25/23 0940 30 mmol at 12/25/23 0940    senna (SENOKOT) tablet 8.6 mg  1 tablet Oral HS PRN Shreyan Harper, DO        trimethobenzamide (TIGAN) IM injection 200 mg  200 mg Intramuscular Q6H PRN Shreyan Harper, DO         No Known Allergies    Objective   Vitals:   Blood pressure 126/64, pulse 79, temperature 98.9 °F (37.2 °C), temperature source Axillary, resp. rate 13, height 5' 5\" (1.651 m), weight 102 kg (224 lb 6.9 oz), SpO2 97%.    Intake/Output Summary (Last 24 hours) at 12/25/2023 1244  Last data filed at 12/25/2023 1157  Gross per 24 hour   Intake 2915.02 ml   Output 1250 ml   Net 1665.02 ml     Invasive Devices       Peripheral Intravenous Line  Duration             Peripheral IV 12/24/23 Left Antecubital <1 day    Peripheral IV 12/24/23 Right Antecubital <1 day                    Physical Exam  Vitals reviewed.   Constitutional:       Appearance: Normal appearance.      Comments: Sleeping but arousable to voice   HENT:      Head: Normocephalic and atraumatic.      Mouth/Throat:      Mouth: Mucous membranes are moist.      Pharynx: Oropharynx is clear.   Eyes:      Extraocular Movements: Extraocular movements intact.      Pupils: Pupils are equal, round, and reactive to light.   Cardiovascular:      Rate and Rhythm: Normal rate and regular rhythm.      Pulses: Normal pulses.      Heart sounds: Normal heart sounds.   Pulmonary:      Effort: Pulmonary effort is normal.      Breath sounds: Normal breath sounds. No wheezing, rhonchi or rales.   Abdominal:      General: Abdomen is flat. Bowel sounds are " "normal. There is no distension.      Tenderness: There is no abdominal tenderness.   Musculoskeletal:         General: No deformity. Normal range of motion.      Cervical back: Normal range of motion and neck supple. No tenderness.      Right lower leg: No edema.      Left lower leg: No edema.   Skin:     General: Skin is warm and dry.   Neurological:      General: No focal deficit present.      Mental Status: He is alert.       The history was obtained from the review of the chart, family.    Lab Results:       Lab Results   Component Value Date    WBC 12.15 (H) 12/25/2023    HGB 15.5 12/25/2023    HCT 46.2 12/25/2023    MCV 90 12/25/2023     12/25/2023     Lab Results   Component Value Date/Time    BUN 25 12/25/2023 09:34 AM    K 3.4 (L) 12/25/2023 09:34 AM     (H) 12/25/2023 09:34 AM    CO2 16 (L) 12/25/2023 09:34 AM    CREATININE 0.92 12/25/2023 09:34 AM    AST 9 (L) 12/24/2023 09:52 PM    ALT 24 12/24/2023 09:52 PM    TP 8.9 (H) 12/24/2023 09:52 PM    ALB 4.3 12/24/2023 09:52 PM     No results for input(s): \"CHOL\", \"HDL\", \"LDL\", \"TRIG\", \"VLDL\" in the last 72 hours.  No results found for: \"MICROALBUR\", \"WCUI28QRU\"  POC Glucose (mg/dl)   Date Value   12/25/2023 177 (H)   12/25/2023 209 (H)       Imaging Studies: I have personally reviewed pertinent reports.      Portions of the record may have been created with voice recognition software.     Please TigerText questions to the clinician covering the \"[id number]-Hswa\" Role. Thank you.  "

## 2023-12-25 NOTE — ASSESSMENT & PLAN NOTE
"No results found for: \"HGBA1C\"    Recent Labs     12/24/23  2307 12/24/23  2359 12/25/23  0109 12/25/23  0204   POCGLU >500* 449* 389* 371*       Blood Sugar Average: Last 72 hrs:  (P) 371    Patient presented with 3 days of worsening fatigue with nausea   Previously undiagnosed diabetic  Traveled from [place] to visit family from holidays  Presented to [facility] [place] ED with increased respiratory rate and BGL > 500  Initiated on and received 3 liters of normal saline before transfer   Placed on DKA protocol   Reassess labs   Q1H fingerstick glucose checks    "

## 2023-12-25 NOTE — ASSESSMENT & PLAN NOTE
Cr 1.99 without established patient baseline  Received 3 L of normal saline before arrival on the ICU  Will reassess Cr with labs  Monitor I&Os

## 2023-12-25 NOTE — ED CARE HANDOFF
Emergency Department Sign Out Note        Sign out and transfer of care from Dr. Philip. See Separate Emergency Department note.     The patient, Praful Quiros, was evaluated by the previous provider for dyspnea x 3 days, with decreased appetite, chills, chest pain, N/V and a near syncopal episode tonight.    Workup Completed:  Initial ER evaluation by Dr. Philip, labs and imaging ordered, see separate ED provider note    ED Course / Workup Pending (followup):                                    ED Course as of 12/25/23 0031   Sun Dec 24, 2023   2324 SO: 58 y M. Prydeinig speaking, here from [place] visiting family. New onset DKA, diabetes, NEERAJ. Dehydration. Accepted to [facility] ICU, Dr. Gates.   Mon Dec 25, 2023   0000 Discussed with radiology on call, Dr. Sandeep Restrepo. CT is limited by motion artifact, appendix is prominent with possible slight inflammation but may also be related to motion artifact.  In the setting of benign abdominal exam without focal tenderness, would favor this being a result of the motion artifact rather than early appendicitis.   0016 CT abdomen pelvis with contrast  IMPRESSION:  Motion-limited study.  Prominent appendix is seen measuring up to 7 mm. There is the suggestion of trace periappendiceal inflammatory changes, limited evaluation secondary to significant motion. Early/developing appendicitis cannot be entirely excluded.  Right testis incidentally noted in the inguinal canal.  Findings discussed with Dr. Luis Felipe Keller at 12:06 AM on 12/25/2023.     Procedures  Medical Decision Making  This is a 58-year-old male with no documented past medical history presenting for evaluation of dyspnea x 3 days followed by an episode of near syncope today with additional symptoms consisting of decreased appetite, nausea and vomiting, abdominal pain, headache, chills, and chest pain.  Patient was initially seen and evaluated by Dr. Philip, and I received the patient in signout at end of shift  prior to final transfer.  Patient was found to be markedly hyperglycemic, dehydrated, with NEERAJ and suspected new onset diabetes and DKA.  DKA treatment initiated including potassium repletion, IV fluids, and insulin.  The patient was accepted to [facility] (ICU, Dr. Gates), EMTALA completed by Dr. Philip and signed by patient and the patient was signed over to me at the end of Dr. Philip's shift prior to transfer by ambulance to [facility]. Patient's rectal temperature was 94.9 °F, MANDEEP hugger warming initiated prior to transfer. Discussed with radiology on call, Dr. Sandeep Restrepo. CT is limited by motion artifact, appendix is prominent with possible slight inflammation but may also be related to motion artifact.  In the setting of benign abdominal exam without focal tenderness, would favor this being a result of the motion artifact rather than early appendicitis.  I discussed these findings with Critical Care at [facility], Dr. Gates, and will start IV antibiotics and plan for general surgery consult at [facility]. IV Zosyn ordered.    Amount and/or Complexity of Data Reviewed  Labs: ordered.  Radiology: ordered. Decision-making details documented in ED Course.    Risk  OTC drugs.  Prescription drug management.          Disposition  Final diagnoses:   DKA (diabetic ketoacidosis) ([facility])   Newly diagnosed diabetes (HCC)   NEERAJ (acute kidney injury) ([facility])   High anion gap metabolic acidosis     Time reflects when diagnosis was documented in both MDM as applicable and the Disposition within this note       Time User Action Codes Description Comment    12/24/2023 10:31 PM Lito Philip [E11.10] DKA (diabetic ketoacidosis) ([facility])     12/24/2023 10:31 PM Lito Philip Add [E10.9] New onset of diabetes mellitus in pediatric patient (HCC)     12/24/2023 10:31 PM Lito Philip Remove [E10.9] New onset of diabetes mellitus in pediatric patient ([facility])     12/24/2023 10:31 PM Lito Philip  [E11.9] Newly diagnosed diabetes ([facility])     12/24/2023 10:31 PM Lito Philip [N17.9] NEERAJ (acute kidney injury) ([facility])     12/24/2023 10:31 PM Lito Philip [E87.29] High anion gap metabolic acidosis           ED Disposition       ED Disposition   Transfer to Another Facility-In Network    Condition   --    Date/Time   Sun Dec 24, 2023 11:00 PM    Comment   Praful Quiros should be transferred out to [facility], accepted by Dr. Bender.               MD Documentation      Flowsheet Row Most Recent Value   Patient Condition The patient has been stabilized such that within reasonable medical probability, no material deterioration of the patient condition or the condition of the unborn child(ayo) is likely to result from the transfer   Reason for Transfer Level of Care needed not available at this facility   Benefits of Transfer Specialized equipment and/or services available at the receiving facility (Include comment)________________________   Risks of Transfer Potential for delay in receiving treatment, Potential deterioration of medical condition   Accepting Physician Dr. Bender   Sending MD Lito Philip MD          RN Documentation      Flowsheet Row Most Recent Value   Transport Mode Ambulance   Level of Care CCT-Nurse          Follow-up Information    None       Patient's Medications    No medications on file     No discharge procedures on file.       ED Provider  Electronically Signed by     Luis Felipe Keller MD  12/25/23 0031       Luis Felipe Keller MD  12/25/23 0031

## 2023-12-25 NOTE — H&P
"[facility]  H&P  Name: Praful Quiros 58 y.o. male I MRN: [id number]  Unit/Bed#: ICU 12 I Date of Admission: 12/25/2023   Date of Service: 12/25/2023 I Hospital Day: 0      Assessment/Plan   * DKA (diabetic ketoacidosis) ([facility])  Assessment & Plan  No results found for: \"HGBA1C\"    Recent Labs     12/24/23  2307 12/24/23  2359 12/25/23  0109 12/25/23  0204   POCGLU >500* 449* 389* 371*       Blood Sugar Average: Last 72 hrs:  (P) 371    Patient presented with 3 days of worsening fatigue with nausea   Previously undiagnosed diabetic  Traveled from [place] to visit family from holidays  Presented to [place] ED with increased respiratory rate and BGL > 500  Initiated on and received 3 liters of normal saline before transfer   Placed on DKA protocol   Reassess labs   Q1H fingerstick glucose checks      NEERAJ (acute kidney injury) ([facility])  Assessment & Plan  Cr 1.99 without established patient baseline  Received 3 L of normal saline before arrival on the ICU  Will reassess Cr with labs  Monitor I&Os           History of Present Illness     HPI: Praful Quiros is a 58 y.o. with a PMHx of perforated appendicitis s/p appendectomy with complications involving colectomy and reversal 8 years ago but otherwise no unremarkable medical history who presents with hyperglycemia and NEERAJ from [facility].  Patient presented to the emergency department with a complaint of nausea and vomiting with increased respirations and fatigue.  His family reported that he arrived from [place] for the holidays 4 days ago and \"felt fine\".  Over the last 3 days he began to have decreased oral intake with no bowel movements.  They reported increased urination frequency and chills with increased fatigue.  Yesterday afternoon he began to develop nausea and one episode of non bilious, non bloody emesis as well as increased respiration rate.  An extended family member, Dr. Bryson Allred, a hospitalist at [facility] in " [place], arrived at the family meal and noted his increased respirations and instructed him to go to the ED.  She assisted with translation at bedside via telephone.  He has no known history of diabetes.  He still reports chills but denies headache, change in vision, lightheadedness, chest pain, shortness of breath, abdominal pain, diarrhea, numbness, tingling, weakness, hematuria, dysuria, edema, rash or fever.    History obtained from chart review, the patient, and extended family via translation.  Review of Systems   Constitutional:  Positive for appetite change, chills and fatigue. Negative for fever.   HENT:  Negative for congestion, ear pain, rhinorrhea and sore throat.    Eyes:  Negative for photophobia, pain and visual disturbance.   Respiratory:  Negative for cough, shortness of breath, wheezing and stridor.    Cardiovascular:  Negative for chest pain, palpitations and leg swelling.   Gastrointestinal:  Positive for nausea and vomiting. Negative for abdominal distention, abdominal pain and diarrhea.   Endocrine: Negative.    Genitourinary:  Positive for frequency. Negative for dysuria and hematuria.   Musculoskeletal:  Negative for arthralgias, back pain, neck pain and neck stiffness.   Skin:  Negative for color change and rash.   Allergic/Immunologic: Negative.    Neurological:  Negative for dizziness, seizures, syncope, weakness, light-headedness, numbness and headaches.   Hematological: Negative.    Psychiatric/Behavioral: Negative.     All other systems reviewed and are negative.    Disposition: Stepdown Level 1  Historical Information   No past medical history on file. Past Surgical History:  No date: ABDOMINAL SURGERY  No date: APPENDECTOMY   No current outpatient medications No Known Allergies   Social History     Tobacco Use    Smoking status: Never     Passive exposure: Never    Smokeless tobacco: Never   Vaping Use    Vaping status: Former   Substance Use Topics    Alcohol use: Yes     Comment:  social    Drug use: Not Currently    History reviewed. No pertinent family history.       Objective                            Vitals I/O      Most Recent Min/Max in 24hrs   Temp 97.6 °F (36.4 °C) Temp  Min: 94.9 °F (34.9 °C)  Max: 97.6 °F (36.4 °C)   Pulse 88 Pulse  Min: 86  Max: 100   Resp 22 Resp  Min: 20  Max: 42   /73 BP  Min: 113/59  Max: 158/77   O2 Sat 99 % SpO2  Min: 98 %  Max: 99 %    No intake or output data in the 24 hours ending 12/25/23 0234    Diet NPO    Invasive Monitoring           Physical Exam   Physical Exam  Vitals and nursing note reviewed.   Eyes:      Extraocular Movements: Extraocular movements intact.      Conjunctiva/sclera: Conjunctivae normal.      Pupils: Pupils are equal, round, and reactive to light.   Skin:     General: Skin is warm and not mottled extremities.      Coloration: Skin is not jaundiced or pale.      Findings: No rash or wound.   HENT:      Head: Normocephalic and atraumatic.      Nose: No rhinorrhea.      Mouth/Throat:      Mouth: Mucous membranes are moist.      Pharynx: No oropharyngeal exudate.   Neck:      Vascular: No JVD.   no midline tenderness Cardiovascular:      Rate and Rhythm: Regular rhythm. Tachycardia present.      Pulses: Normal pulses.      Heart sounds: Normal heart sounds.   Musculoskeletal:         General: No swelling or tenderness. Normal range of motion.      Right lower leg: No edema.      Left lower leg: No edema.   Abdominal: General: There is no distension.      Palpations: Abdomen is soft.      Tenderness: There is no abdominal tenderness. There is no guarding.   Constitutional:       General: He is not in acute distress.     Appearance: He is well-developed and well-nourished. He is ill-appearing.      Interventions: He is not sedated and not intubated.  Pulmonary:      Effort: Pulmonary effort is normal. No accessory muscle usage, respiratory distress or accessory muscle usage. He is not intubated.      Breath sounds: Normal breath  sounds. No stridor. No wheezing, rhonchi or rales.   Chest:      Chest wall: No tenderness.   Psychiatric:         Speech: Speech is not no expressive aphasia.   Neurological:      General: No focal deficit present.      Mental Status: He is alert and oriented to person, place and time. Mental status is at baseline. He is calm.      Cranial Nerves: No dysarthria or facial asymmetry.      Sensory: Sensation is intact.      Motor: gross motor function is at baseline for patient. Strength full and intact in all extremities. No motor deficit.            Diagnostic Studies      EKG: None  Imaging:  I have personally reviewed pertinent reports.   and I have personally reviewed pertinent films in PACS     Medications:  Scheduled PRN   chlorhexidine, 15 mL, Q12H MELO  heparin (porcine), 5,000 Units, Q8H MELO          Continuous    dextrose 5% lactated ringer's, 250 mL/hr  insulin regular (HumuLIN R,NovoLIN R) 1 Units/mL in sodium chloride 0.9 % 100 mL infusion, 0.1-30 Units/hr, Last Rate: 20.4 Units/hr (12/25/23 0206)  multi-electrolyte, 500 mL/hr, Last Rate: 500 mL/hr (12/25/23 0221)   Followed by  multi-electrolyte, 250 mL/hr         Labs:    CBC    Recent Labs     12/24/23 2156   WBC 17.73*   HGB 18.8*   HCT 56.0*        BMP    Recent Labs     12/24/23 2152 12/25/23  0000   SODIUM 135 139   K 4.7 3.6    112*   CO2 6* 5*   AGAP 27 22   BUN 44* 37*   CREATININE 1.99* 1.55*   CALCIUM 9.5 7.9*       Coags    Recent Labs     12/24/23 2152   INR 1.12   PTT 27        Additional Electrolytes  Recent Labs     12/24/23 2152   MG 2.9*          Blood Gas    Recent Labs     12/24/23 2202   PHART 7.107*   APH6OVT 11.2*   PO2ART 109.0   [id number] 3.5*   BEART -23.3   SOURCE Radial, Right     Recent Labs     12/24/23 2202   SOURCE Radial, Right    LFTs  Recent Labs     12/24/23 2152   ALT 24   AST 9*   ALKPHOS 122*   ALB 4.3   TBILI 0.75       Infectious  Recent Labs     12/24/23 2152   PROCALCITONI 0.15      Glucose  Recent Labs     12/24/23  2152 12/25/23  0000   GLUC 665* 455*             Critical Care Time Delivered : Upon my evaluation, this patient had a high probability of imminent or life-threatening deterioration due to DKA, which required my direct attention, intervention, and personal management.  I have personally provided 30 minutes of critical care time, exclusive of procedures, teaching, family meetings, and any prior time recorded by providers other than myself.   Anticipated Length of Stay is > 2 midnights  Sanju Bentley, DO

## 2023-12-25 NOTE — ED PROVIDER NOTES
History  Chief Complaint   Patient presents with    Syncope     Arrived from [place] Thursday, loss of appetite, nausea, vomiting, headaches since yesterday, tachypneic, chills, chest pain     58-year-old male with no past medical history presents today with 3 days of shortness of breath, today felt an episode of near syncope, the patient has felt dizzy, with decreased appetite, nausea and vomiting, abdominal pain, headache, chills, chest pain, the patient is visiting from [place], has no other reported medical history, previously had an appendectomy, and the patient has family that is visiting with him from [place] who is an internal medicine physician at [employer], who reports that he also has not had a bowel movement 3 days.  The patient has had no cough, fevers, diarrhea, dysuria, hematuria, reports that his abdominal pain is periumbilical, and his chest pain is midsternal and does not radiate.        None       History reviewed. No pertinent past medical history.    Past Surgical History:   Procedure Laterality Date    ABDOMINAL SURGERY      APPENDECTOMY         History reviewed. No pertinent family history.  I have reviewed and agree with the history as documented.    E-Cigarette/Vaping    E-Cigarette Use Former User      E-Cigarette/Vaping Substances     Social History     Tobacco Use    Smoking status: Never     Passive exposure: Never    Smokeless tobacco: Never   Vaping Use    Vaping status: Former   Substance Use Topics    Alcohol use: Yes     Comment: social    Drug use: Not Currently       Review of Systems   Constitutional:  Positive for chills. Negative for fever.   HENT:  Negative for ear pain and sore throat.    Eyes:  Negative for pain and visual disturbance.   Respiratory:  Positive for shortness of breath. Negative for cough.    Cardiovascular:  Positive for chest pain. Negative for palpitations.   Gastrointestinal:  Positive for abdominal pain, constipation, nausea and vomiting.   Endocrine:  Positive for polyuria.   Genitourinary:  Negative for dysuria and hematuria.   Musculoskeletal:  Negative for arthralgias and back pain.   Skin:  Negative for color change and rash.   Neurological:  Negative for seizures and syncope.   All other systems reviewed and are negative.      Physical Exam  Physical Exam  Vitals and nursing note reviewed.   Constitutional:       General: He is in acute distress.      Appearance: Normal appearance. He is ill-appearing.   HENT:      Head: Normocephalic and atraumatic.      Right Ear: External ear normal.      Left Ear: External ear normal.      Mouth/Throat:      Mouth: Mucous membranes are moist.      Pharynx: Oropharynx is clear.   Eyes:      Conjunctiva/sclera: Conjunctivae normal.      Pupils: Pupils are equal, round, and reactive to light.   Cardiovascular:      Rate and Rhythm: Regular rhythm. Tachycardia present.      Heart sounds: Normal heart sounds.   Pulmonary:      Effort: Tachypnea and respiratory distress present.      Breath sounds: Normal breath sounds.   Abdominal:      General: Abdomen is flat. There is no distension.      Palpations: Abdomen is soft.      Tenderness: There is no abdominal tenderness.      Comments: Despite patient's report of abdominal pain the patient has no tenderness to palpation   Musculoskeletal:         General: No deformity. Normal range of motion.      Cervical back: Normal range of motion.   Skin:     General: Skin is warm and dry.   Neurological:      General: No focal deficit present.      Mental Status: He is alert and oriented to person, place, and time.      Comments: Listless but awake alert and oriented   Psychiatric:         Mood and Affect: Mood normal.         Behavior: Behavior normal.         Vital Signs  ED Triage Vitals   Temp Pulse Respirations Blood Pressure SpO2   -- 12/24/23 2116 12/24/23 2116 12/24/23 2116 12/24/23 2116    100 (!) 42 158/77 99 %      Temp Source Heart Rate Source Patient Position - Orthostatic VS  BP Location FiO2 (%)   12/24/23 2116 12/24/23 2116 12/24/23 2116 12/24/23 2116 --   Oral Monitor Lying Right arm       Pain Score       12/24/23 2315       No Pain           Vitals:    12/24/23 2215 12/24/23 2230 12/24/23 2315 12/24/23 2330   BP: 141/67 149/69 142/68 149/72   Pulse: 86 87 93 92   Patient Position - Orthostatic VS: Sitting Sitting Lying Lying         Visual Acuity      ED Medications  Medications   sodium chloride 0.9 % bolus 1,000 mL (0 mL Intravenous Stopped 12/24/23 2328)     Followed by   sodium chloride 0.9 % bolus 1,000 mL (1,000 mL Intravenous New Bag 12/24/23 2201)     Followed by   sodium chloride 0.9 % bolus 1,000 mL (1,000 mL Intravenous New Bag 12/24/23 2204)     Followed by   sodium chloride 0.9 % bolus 1,000 mL (54 mL Intravenous New Bag 12/24/23 2328)   insulin regular (HumuLIN R,NovoLIN R) 1 Units/mL in sodium chloride 0.9 % 100 mL infusion (10.2 Units/hr Intravenous New Bag 12/24/23 2259)   potassium chloride 20 mEq IVPB (premix) (20 mEq Intravenous New Bag 12/24/23 2259)   sodium chloride 0.9 % infusion (has no administration in time range)   insulin regular (HumuLIN R,NovoLIN R) injection 10 Units (10 Units Intravenous Given 12/24/23 2259)   iohexol (OMNIPAQUE) 350 MG/ML injection (SINGLE-DOSE) 100 mL (100 mL Intravenous Given 12/24/23 2309)       Diagnostic Studies  Results Reviewed       Procedure Component Value Units Date/Time    UA w Reflex to Microscopic w Reflex to Culture [[id number]]  (Abnormal) Collected: 12/24/23 2306    Lab Status: Final result Specimen: Urine, Clean Catch Updated: 12/24/23 2312     Color, UA Yellow     Clarity, UA Clear     Specific Gravity, UA 1.025     pH, UA 6.0     Leukocytes, UA Negative     Nitrite, UA Negative     Protein, UA 1+ mg/dl      Glucose, UA 2+ mg/dl      Ketones, UA 80 (3+) mg/dl      Urobilinogen, UA 0.2 E.U./dl      Bilirubin, UA Negative     Occult Blood, UA 2+    Urine Microscopic [[id number]] Collected: 12/24/23 8358    Lab Status:  In process Specimen: Urine, Clean Catch Updated: 12/24/23 2312    Osmolality, urine [[id number]] Collected: 12/24/23 2306    Lab Status: In process Specimen: Urine, Clean Catch Updated: 12/24/23 2309    Sodium, urine, random [[id number]] Collected: 12/24/23 2306    Lab Status: In process Specimen: Urine, Clean Catch Updated: 12/24/23 2309    Fingerstick Glucose (POCT) [[id number]]  (Abnormal) Collected: 12/24/23 2307    Lab Status: Final result Updated: 12/24/23 2308     POC Glucose >500 mg/dl     FLU/RSV/COVID - if FLU/RSV clinically relevant [[id number]]  (Normal) Collected: 12/24/23 2152    Lab Status: Final result Specimen: Nares from Nose Updated: 12/24/23 2241     SARS-CoV-2 Negative     INFLUENZA A PCR Negative     INFLUENZA B PCR Negative     RSV PCR Negative    Narrative:      FOR PEDIATRIC PATIENTS - copy/paste COVID Guidelines URL to browser: [email or web address]    SARS-CoV-2 assay is a Nucleic Acid Amplification assay intended for the  qualitative detection of nucleic acid from SARS-CoV-2 in nasopharyngeal  swabs. Results are for the presumptive identification of SARS-CoV-2 RNA.    Positive results are indicative of infection with SARS-CoV-2, the virus  causing COVID-19, but do not rule out bacterial infection or co-infection  with other viruses. Laboratories within the [place] and its  territories are required to report all positive results to the appropriate  public health authorities. Negative results do not preclude SARS-CoV-2  infection and should not be used as the sole basis for treatment or other  patient management decisions. Negative results must be combined with  clinical observations, patient history, and epidemiological information.  This test has not been FDA cleared or approved.    This test has been authorized by FDA under an Emergency Use Authorization  (EUA). This test is only authorized for the duration of time the  declaration  that circumstances exist justifying the authorization of the  emergency use of an in vitro diagnostic tests for detection of SARS-CoV-2  virus and/or diagnosis of COVID-19 infection under section 564(b)(1) of  the Act, 21 U.S.C. 360bbb-3(b)(1), unless the authorization is terminated  or revoked sooner. The test has been validated but independent review by FDA  and CLIA is pending.    Test performed using [employer] GeneXpert: This RT-PCR assay targets N2,  a region unique to SARS-CoV-2. A conserved region in the E-gene was chosen  for pan-Sarbecovirus detection which includes SARS-CoV-2.    According to CMS-2020-01-R, this platform meets the definition of high-throughput technology.    B-Type Natriuretic Peptide(BNP) [[id number]]  (Normal) Collected: 12/24/23 2152    Lab Status: Final result Specimen: Blood from Arm, Right Updated: 12/24/23 2236     BNP 30 pg/mL     Basic metabolic panel [[id number]]     Lab Status: No result Specimen: Blood     Procalcitonin [[id number]]  (Normal) Collected: 12/24/23 2152    Lab Status: Final result Specimen: Blood from Arm, Right Updated: 12/24/23 2229     Procalcitonin 0.15 ng/ml     HS Troponin I 2hr [[id number]]     Lab Status: No result Specimen: Blood     HS Troponin 0hr (reflex protocol) [[id number]]  (Normal) Collected: 12/24/23 2152    Lab Status: Final result Specimen: Blood from Arm, Right Updated: 12/24/23 2226     hs TnI 0hr 6 ng/L     Lactic acid [[id number]]  (Abnormal) Collected: 12/24/23 2152    Lab Status: Final result Specimen: Blood from Arm, Right Updated: 12/24/23 2226     LACTIC ACID 2.8 mmol/L     Narrative:      Result may be elevated if tourniquet was used during collection.    Lactic acid 2 Hours [[id number]]     Lab Status: No result Specimen: Blood     Comprehensive metabolic panel [[id number]]  (Abnormal) Collected: 12/24/23 2152    Lab Status: Final result Specimen: Blood from Arm, Right Updated: 12/24/23 2225     Sodium 135 mmol/L      Potassium 4.7 mmol/L       Chloride 102 mmol/L      CO2 6 mmol/L      ANION GAP 27 mmol/L      BUN 44 mg/dL      Creatinine 1.99 mg/dL      Glucose 665 mg/dL      Calcium 9.5 mg/dL      AST 9 U/L      ALT 24 U/L      Alkaline Phosphatase 122 U/L      Total Protein 8.9 g/dL      Albumin 4.3 g/dL      Total Bilirubin 0.75 mg/dL      eGFR 36 ml/min/1.73sq m     Narrative:      National Kidney Disease Foundation guidelines for Chronic Kidney Disease (CKD):     Stage 1 with normal or high GFR (GFR > 90 mL/min/1.73 square meters)    Stage 2 Mild CKD (GFR = 60-89 mL/min/1.73 square meters)    Stage 3A Moderate CKD (GFR = 45-59 mL/min/1.73 square meters)    Stage 3B Moderate CKD (GFR = 30-44 mL/min/1.73 square meters)    Stage 4 Severe CKD (GFR = 15-29 mL/min/1.73 square meters)    Stage 5 End Stage CKD (GFR <15 mL/min/1.73 square meters)  Note: GFR calculation is accurate only with a steady state creatinine    Magnesium [[id number]]  (Abnormal) Collected: 12/24/23 2152    Lab Status: Final result Specimen: Blood from Arm, Right Updated: 12/24/23 2223     Magnesium 2.9 mg/dL     CBC and differential [[id number]]  (Abnormal) Collected: 12/24/23 2156    Lab Status: Final result Specimen: Blood from Arm, Right Updated: 12/24/23 2219     WBC 17.73 Thousand/uL      RBC 6.21 Million/uL      Hemoglobin 18.8 g/dL      Hematocrit 56.0 %      MCV 90 fL      MCH 30.3 pg      MCHC 33.6 g/dL      RDW 14.2 %      MPV 12.3 fL      Platelets 243 Thousands/uL      nRBC 0 /100 WBCs      Neutrophils Relative 84 %      Immat GRANS % 1 %      Lymphocytes Relative 7 %      Monocytes Relative 8 %      Eosinophils Relative 0 %      Basophils Relative 0 %      Neutrophils Absolute 14.98 Thousands/µL      Immature Grans Absolute 0.11 Thousand/uL      Lymphocytes Absolute 1.24 Thousands/µL      Monocytes Absolute 1.35 Thousand/µL      Eosinophils Absolute 0.00 Thousand/µL      Basophils Absolute 0.05 Thousands/µL     Protime-INR [[id number]]  (Normal) Collected: 12/24/23 2152     "Lab Status: Final result Specimen: Blood from Arm, Right Updated: 12/24/23 2217     Protime 14.3 seconds      INR 1.12    APTT [[id number]]  (Normal) Collected: 12/24/23 2152    Lab Status: Final result Specimen: Blood from Arm, Right Updated: 12/24/23 2217     PTT 27 seconds     Blood gas, Arterial [[id number]]  (Abnormal) Collected: 12/24/23 2202    Lab Status: Final result Specimen: Blood, Arterial from Radial, Right Updated: 12/24/23 2216     pH, Arterial 7.107     pCO2, Arterial 11.2 mm Hg      pO2, Arterial 109.0 mm Hg      HCO3, Arterial 3.5 mmol/L      Base Excess, Arterial -23.3 mmol/L      O2 Content, Arterial 24.7 mL/dL      O2 HGB,Arterial  96.8 %      SOURCE Radial, Right     JAMIE TEST Yes     ROOM AIR FIO2 21 %     Beta Hydroxybutyrate [[id number]]  (Abnormal) Collected: 12/24/23 2152    Lab Status: Final result Specimen: Blood from Arm, Right Updated: 12/24/23 2213     BETA-HYDROXYBUTYRATE 6.1 mmol/L     Blood culture #2 [[id number]] Collected: 12/24/23 2152    Lab Status: In process Specimen: Blood from Arm, Right Updated: 12/24/23 2205    Blood culture #1 [[id number]] Collected: 12/24/23 2152    Lab Status: In process Specimen: Blood from Arm, Left Updated: 12/24/23 2205    Osmolality-\"If this is regarding a toxic alcohol, please STOP and consult medical  for further guidance.\" [[id number]] Collected: 12/24/23 2152    Lab Status: In process Specimen: Blood from Arm, Right Updated: 12/24/23 2201    Fingerstick Glucose (POCT) [[id number]]  (Abnormal) Collected: 12/24/23 2129    Lab Status: Final result Updated: 12/24/23 2134     POC Glucose >500 mg/dl                    CT abdomen pelvis with contrast    (Results Pending)   XR chest portable    (Results Pending)              Procedures  ECG 12 Lead Documentation Only    Date/Time: 12/24/2023 10:13 PM    Performed by: Lito Philip MD  Authorized by: Lito Philip MD    ECG reviewed by me, the ED Provider: yes    Patient " location:  ED  Previous ECG:     Previous ECG:  Unavailable  Interpretation:     Interpretation: normal    Quality:     Tracing quality:  Limited by artifact  Rate:     ECG rate:  94    ECG rate assessment: normal    Rhythm:     Rhythm: sinus rhythm    Ectopy:     Ectopy: none    QRS:     QRS axis:  Normal    QRS intervals:  Normal  Conduction:     Conduction: normal    ST segments:     ST segments:  Normal  T waves:     T waves: normal    CriticalCare Time    Date/Time: 12/24/2023 11:06 PM    Performed by: Lito Philip MD  Authorized by: Lito Philip MD    Critical care provider statement:     Critical care time (minutes):  45    Critical care time was exclusive of:  Separately billable procedures and treating other patients    Critical care was necessary to treat or prevent imminent or life-threatening deterioration of the following conditions:  Dehydration, endocrine crisis and renal failure    Critical care was time spent personally by me on the following activities:  Blood draw for specimens, discussions with consultants, evaluation of patient's response to treatment, interpretation of cardiac output measurements, ordering and review of laboratory studies and ordering and performing treatments and interventions           ED Course  ED Course as of 12/24/23 2339   Sun Dec 24, 2023   2222 My wet read of the patient's chest x-ray is unremarkable with no signs of consolidation.   2324 Urine results evaluated, are negative, at this time I do not believe the patient is septic.       Medical Decision Making  58-year-old male presents with hypercapnia, dyspnea, chest pain, constipation, near syncope, the fingerstick glucose is greater than 500, my strong suspicion is for DKA, he will also be evaluated for possible sepsis although at this point I do not have a source of infection and therefore I do not believe he is septic at this time but it will be investigated.  I am also concerned he may  have an acute kidney injury, significant dehydration, and constipation given his lack of bowel movements and previous abdominal surgeries he may also have a small bowel obstruction.  However he was not significantly tender to palpation with no guarding on his abdomen so my suspicion for an SBO is low.  If he does have DKA he appears unwell and will most likely be transferred to an ICU for further management.    Patient has been accepted to the ICU, the patient care be transferred to Dr. Keller for further management, pending transfer.    Amount and/or Complexity of Data Reviewed  Labs: ordered.  Radiology: ordered.    Risk  OTC drugs.  Prescription drug management.          Disposition  Final diagnoses:   DKA (diabetic ketoacidosis) (HCC)   Newly diagnosed diabetes (HCC)   NEERAJ (acute kidney injury) ([facility])   High anion gap metabolic acidosis     Time reflects when diagnosis was documented in both MDM as applicable and the Disposition within this note       Time User Action Codes Description Comment    12/24/2023 10:31 PM Lito Philip [E11.10] DKA (diabetic ketoacidosis) ([facility])     12/24/2023 10:31 PM Lito Philip Add [E10.9] New onset of diabetes mellitus in pediatric patient ([facility])     12/24/2023 10:31 PM Lito Philip Remove [E10.9] New onset of diabetes mellitus in pediatric patient ([facility])     12/24/2023 10:31 PM Lito Philip Add [E11.9] Newly diagnosed diabetes ([facility])     12/24/2023 10:31 PM Lito Philip Add [N17.9] NEERAJ (acute kidney injury) ([facility])     12/24/2023 10:31 PM Lito Philip [E87.29] High anion gap metabolic acidosis           ED Disposition       ED Disposition   Transfer to Another Facility-In Network    Condition   --    Date/Time   Sun Dec 24, 2023 11:00 PM    Comment   [facility] should be transferred out to [facility], accepted by Dr. Bender.               MD Documentation      Flowsheet Row Most Recent Value   Patient Condition The patient has  been stabilized such that within reasonable medical probability, no material deterioration of the patient condition or the condition of the unborn child(ayo) is likely to result from the transfer   Reason for Transfer Level of Care needed not available at this facility   Benefits of Transfer Specialized equipment and/or services available at the receiving facility (Include comment)________________________   Risks of Transfer Potential for delay in receiving treatment, Potential deterioration of medical condition   Accepting Physician Dr. Napoleon Philip MD          RN Documentation      Flowsheet Row Most Recent Value   Transport Mode Ambulance   Level of Care CCT-Nurse          Follow-up Information    None         Patient's Medications    No medications on file       No discharge procedures on file.    PDMP Review       None            ED Provider  Electronically Signed by             Lito Philip MD  12/24/23 6843

## 2023-12-25 NOTE — ED NOTES
Transfer Information:   with [facility] @ 00:30-01:00  [place] ICU #12  Dr. Gates accepting  Call report: [phone]     Hebert Ramirez RN  12/24/23 6954

## 2023-12-25 NOTE — SEPSIS NOTE
Sepsis Note   Praful Quiros 58 y.o. male MRN: [id number]  Unit/Bed#: ICU 12 Encounter: [id number]       Initial Sepsis Screening       Row Name 12/25/23 0228                Is the patient's history suggestive of a new or worsening infection? No  -                  User Key  (r) = Recorded By, (t) = Taken By, (c) = Cosigned By      Initials Name Provider Type     Laxmi Fang PA-C Physician Assistant                        Body mass index is 37.35 kg/m².  Wt Readings from Last 1 Encounters:   12/25/23 102 kg (224 lb 6.9 oz)     IBW (Ideal Body Weight): 61.5 kg    Ideal body weight: 61.5 kg (135 lb 9.3 oz)  Adjusted ideal body weight: 77.6 kg (171 lb 1.9 oz)

## 2023-12-25 NOTE — EMTALA/ACUTE CARE TRANSFER
[facility] EMERGENCY DEPARTMENT  [address] [place] [id number]  Dept: [phone]      EMTALA TRANSFER CONSENT    NAME Praful DIAZ 1965                              MRN [id number]    I have been informed of my rights regarding examination, treatment, and transfer   by Dr. Lito Philip*    Benefits: Specialized equipment and/or services available at the receiving facility (Include comment)________________________    Risks: Potential for delay in receiving treatment, Potential deterioration of medical condition      Consent for Transfer:  I acknowledge that my medical condition has been evaluated and explained to me by the emergency department physician or other qualified medical person and/or my attending physician, who has recommended that I be transferred to the service of  Accepting Physician: Dr. Bender at  . The above potential benefits of such transfer, the potential risks associated with such transfer, and the probable risks of not being transferred have been explained to me, and I fully understand them.  The doctor has explained that, in my case, the benefits of transfer outweigh the risks.  I agree to be transferred.    I authorize the performance of emergency medical procedures and treatments upon me in both transit and upon arrival at the receiving facility.  Additionally, I authorize the release of any and all medical records to the receiving facility and request they be transported with me, if possible.  I understand that the safest mode of transportation during a medical emergency is an ambulance and that the Hospital advocates the use of this mode of transport. Risks of traveling to the receiving facility by car, including absence of medical control, life sustaining equipment, such as oxygen, and medical personnel has been explained to me and I fully understand them.    (ISIDRO CORRECT BOX BELOW)  [  ]  I  consent to the stated transfer and to be transported by ambulance/helicopter.  [  ]  I consent to the stated transfer, but refuse transportation by ambulance and accept full responsibility for my transportation by car.  I understand the risks of non-ambulance transfers and I exonerate the Hospital and its staff from any deterioration in my condition that results from this refusal.    X___________________________________________    DATE  23  TIME________  Signature of patient or legally responsible individual signing on patient behalf           RELATIONSHIP TO PATIENT_________________________          Provider Certification    NAME Praful Quiros                                        [facility] 1965                              MRN [id number]    A medical screening exam was performed on the above named patient.  Based on the examination:    Condition Necessitating Transfer The primary encounter diagnosis was DKA (diabetic ketoacidosis) ([facility]). Diagnoses of Newly diagnosed diabetes (HCC), NEERAJ (acute kidney injury) (HCC), and High anion gap metabolic acidosis were also pertinent to this visit.    Patient Condition: The patient has been stabilized such that within reasonable medical probability, no material deterioration of the patient condition or the condition of the unborn child(ayo) is likely to result from the transfer    Reason for Transfer: Level of Care needed not available at this facility    Transfer Requirements: Facility     Space available and qualified personnel available for treatment as acknowledged by    Agreed to accept transfer and to provide appropriate medical treatment as acknowledged by       Dr. Bender  Appropriate medical records of the examination and treatment of the patient are provided at the time of transfer   STAFF INITIAL WHEN COMPLETED _______  Transfer will be performed by qualified personnel from    and appropriate transfer equipment as required, including the use of  necessary and appropriate life support measures.    Provider Certification: I have examined the patient and explained the following risks and benefits of being transferred/refusing transfer to the patient/family:         Based on these reasonable risks and benefits to the patient and/or the unborn child(ayo), and based upon the information available at the time of the patient’s examination, I certify that the medical benefits reasonably to be expected from the provision of appropriate medical treatments at another medical facility outweigh the increasing risks, if any, to the individual’s medical condition, and in the case of labor to the unborn child, from effecting the transfer.    X____________________________________________ DATE 12/24/23        TIME_______      ORIGINAL - SEND TO MEDICAL RECORDS   COPY - SEND WITH PATIENT DURING TRANSFER

## 2023-12-25 NOTE — ED NOTES
Family leaving bedside. Wife remaining. Please offer updates to Jane (Sister-in-law to the daughter- [facility] and can translate to family) [phone]    Daughter's phone number [phone] (Greenlandic speaking)     Lucia Drew RN  12/24/23 4168

## 2023-12-25 NOTE — ASSESSMENT & PLAN NOTE
- Fatigue, N/V, decreased appetite, polyuria, abdominal pain x 3 days. Also endorsed shortness of breath with tachypnea  - Visiting from [place]. He will be going back first week of January. No prior history of DM  - Initially presented to [facility] on 12/24 and was found to be in DKA. Glucose 665, BHB 6.1, AG 27. ABG showing metabolic acidosis (pH 7.107, pCO2 11.2, HCO3 3.5)  - Given 3 L of NS in [facility] ED. Started on DKA protocol and transferred to [facility]. Also started on bicarbonate drip  - Repeat VBG showing metabolic acidosis with respiratory compensation (pH 7.055, pCO2 22.7, HCO3 6.2)  - CXR (12/24) - wet read, no acute cardiopulmonary abnormality  - CTAP (12/14) - prominent appendix measuring up to 7 mm. Suggestion of trace periappendiceal inflammatory changes. Early/developing appendicitis cannot be excluded. However, patient underwent appendectomy with complications involving colectomy and reversal 8 years ago in [place]  - CXR (12/25) - wet read, no acute cardiopulmonary abnormality  - No longer on insulin drip    Plan:  - SSI   - IM Tigan prn nausea  - Advance diet as tolerated. Per endocrindology, add Humalog 10 units TID with meals when he starts eating  - Replete K, Mg, phosphorous as needed

## 2023-12-25 NOTE — ED NOTES
Called Jane (Sister in law to the daughter) per updates request. Notified that pt would be transported to [facility] ICU and that his wife could visit but would need to provide her own transportation to the hospital. This was agreeable. Wife also notified with interpretive service      Lucia Drew RN  12/25/23 0028

## 2023-12-26 LAB
ANION GAP SERPL CALCULATED.3IONS-SCNC: 12 MMOL/L
ANION GAP SERPL CALCULATED.3IONS-SCNC: 15 MMOL/L
ANION GAP SERPL CALCULATED.3IONS-SCNC: 7 MMOL/L
ANION GAP SERPL CALCULATED.3IONS-SCNC: 7 MMOL/L
ANION GAP SERPL CALCULATED.3IONS-SCNC: 8 MMOL/L
ATRIAL RATE: 76 BPM
ATRIAL RATE: 94 BPM
BASOPHILS # BLD AUTO: 0.03 THOUSANDS/ÂΜL (ref 0–0.1)
BASOPHILS NFR BLD AUTO: 0 % (ref 0–1)
BUN SERPL-MCNC: 12 MG/DL (ref 5–25)
BUN SERPL-MCNC: 13 MG/DL (ref 5–25)
BUN SERPL-MCNC: 14 MG/DL (ref 5–25)
BUN SERPL-MCNC: 15 MG/DL (ref 5–25)
BUN SERPL-MCNC: 16 MG/DL (ref 5–25)
CA-I BLD-SCNC: 1.06 MMOL/L (ref 1.12–1.32)
CALCIUM SERPL-MCNC: 7.3 MG/DL (ref 8.4–10.2)
CALCIUM SERPL-MCNC: 7.5 MG/DL (ref 8.4–10.2)
CALCIUM SERPL-MCNC: 7.5 MG/DL (ref 8.4–10.2)
CALCIUM SERPL-MCNC: 7.6 MG/DL (ref 8.4–10.2)
CALCIUM SERPL-MCNC: 7.6 MG/DL (ref 8.4–10.2)
CHLORIDE SERPL-SCNC: 116 MMOL/L (ref 96–108)
CHLORIDE SERPL-SCNC: 118 MMOL/L (ref 96–108)
CHLORIDE SERPL-SCNC: 119 MMOL/L (ref 96–108)
CHLORIDE SERPL-SCNC: 119 MMOL/L (ref 96–108)
CHLORIDE SERPL-SCNC: 120 MMOL/L (ref 96–108)
CO2 SERPL-SCNC: 14 MMOL/L (ref 21–32)
CO2 SERPL-SCNC: 14 MMOL/L (ref 21–32)
CO2 SERPL-SCNC: 20 MMOL/L (ref 21–32)
CO2 SERPL-SCNC: 22 MMOL/L (ref 21–32)
CO2 SERPL-SCNC: 24 MMOL/L (ref 21–32)
CREAT SERPL-MCNC: 0.63 MG/DL (ref 0.6–1.3)
CREAT SERPL-MCNC: 0.68 MG/DL (ref 0.6–1.3)
CREAT SERPL-MCNC: 0.7 MG/DL (ref 0.6–1.3)
CREAT SERPL-MCNC: 0.71 MG/DL (ref 0.6–1.3)
CREAT SERPL-MCNC: 0.76 MG/DL (ref 0.6–1.3)
EOSINOPHIL # BLD AUTO: 0 THOUSAND/ÂΜL (ref 0–0.61)
EOSINOPHIL NFR BLD AUTO: 0 % (ref 0–6)
ERYTHROCYTE [DISTWIDTH] IN BLOOD BY AUTOMATED COUNT: 14.1 % (ref 11.6–15.1)
EST. AVERAGE GLUCOSE BLD GHB EST-MCNC: 301 MG/DL
GFR SERPL CREATININE-BSD FRML MDRD: 101 ML/MIN/1.73SQ M
GFR SERPL CREATININE-BSD FRML MDRD: 104 ML/MIN/1.73SQ M
GFR SERPL CREATININE-BSD FRML MDRD: 104 ML/MIN/1.73SQ M
GFR SERPL CREATININE-BSD FRML MDRD: 106 ML/MIN/1.73SQ M
GFR SERPL CREATININE-BSD FRML MDRD: 109 ML/MIN/1.73SQ M
GLUCOSE SERPL-MCNC: 206 MG/DL (ref 65–140)
GLUCOSE SERPL-MCNC: 216 MG/DL (ref 65–140)
GLUCOSE SERPL-MCNC: 219 MG/DL (ref 65–140)
GLUCOSE SERPL-MCNC: 225 MG/DL (ref 65–140)
GLUCOSE SERPL-MCNC: 225 MG/DL (ref 65–140)
GLUCOSE SERPL-MCNC: 232 MG/DL (ref 65–140)
GLUCOSE SERPL-MCNC: 241 MG/DL (ref 65–140)
GLUCOSE SERPL-MCNC: 242 MG/DL (ref 65–140)
GLUCOSE SERPL-MCNC: 246 MG/DL (ref 65–140)
GLUCOSE SERPL-MCNC: 249 MG/DL (ref 65–140)
GLUCOSE SERPL-MCNC: 253 MG/DL (ref 65–140)
GLUCOSE SERPL-MCNC: 259 MG/DL (ref 65–140)
GLUCOSE SERPL-MCNC: 268 MG/DL (ref 65–140)
GLUCOSE SERPL-MCNC: 287 MG/DL (ref 65–140)
GLUCOSE SERPL-MCNC: 307 MG/DL (ref 65–140)
GLUCOSE SERPL-MCNC: 308 MG/DL (ref 65–140)
GLUCOSE SERPL-MCNC: 311 MG/DL (ref 65–140)
HBA1C MFR BLD: 12.1 %
HCT VFR BLD AUTO: 40.9 % (ref 36.5–49.3)
HGB BLD-MCNC: 14.4 G/DL (ref 12–17)
IMM GRANULOCYTES # BLD AUTO: 0.04 THOUSAND/UL (ref 0–0.2)
IMM GRANULOCYTES NFR BLD AUTO: 0 % (ref 0–2)
LYMPHOCYTES # BLD AUTO: 0.94 THOUSANDS/ÂΜL (ref 0.6–4.47)
LYMPHOCYTES NFR BLD AUTO: 9 % (ref 14–44)
MAGNESIUM SERPL-MCNC: 2.3 MG/DL (ref 1.9–2.7)
MAGNESIUM SERPL-MCNC: 2.4 MG/DL (ref 1.9–2.7)
MAGNESIUM SERPL-MCNC: 2.4 MG/DL (ref 1.9–2.7)
MCH RBC QN AUTO: 30.4 PG (ref 26.8–34.3)
MCHC RBC AUTO-ENTMCNC: 35.2 G/DL (ref 31.4–37.4)
MCV RBC AUTO: 87 FL (ref 82–98)
MONOCYTES # BLD AUTO: 0.69 THOUSAND/ÂΜL (ref 0.17–1.22)
MONOCYTES NFR BLD AUTO: 6 % (ref 4–12)
NEUTROPHILS # BLD AUTO: 9.27 THOUSANDS/ÂΜL (ref 1.85–7.62)
NEUTS SEG NFR BLD AUTO: 85 % (ref 43–75)
NRBC BLD AUTO-RTO: 0 /100 WBCS
P AXIS: 52 DEGREES
P AXIS: 81 DEGREES
PHOSPHATE SERPL-MCNC: 1.6 MG/DL (ref 2.7–4.5)
PHOSPHATE SERPL-MCNC: 1.8 MG/DL (ref 2.7–4.5)
PHOSPHATE SERPL-MCNC: 2 MG/DL (ref 2.7–4.5)
PHOSPHATE SERPL-MCNC: 2 MG/DL (ref 2.7–4.5)
PHOSPHATE SERPL-MCNC: 2.3 MG/DL (ref 2.7–4.5)
PLATELET # BLD AUTO: 141 THOUSANDS/UL (ref 149–390)
PMV BLD AUTO: 12.2 FL (ref 8.9–12.7)
POTASSIUM SERPL-SCNC: 3.2 MMOL/L (ref 3.5–5.3)
POTASSIUM SERPL-SCNC: 3.5 MMOL/L (ref 3.5–5.3)
POTASSIUM SERPL-SCNC: 3.6 MMOL/L (ref 3.5–5.3)
POTASSIUM SERPL-SCNC: 3.7 MMOL/L (ref 3.5–5.3)
POTASSIUM SERPL-SCNC: 3.9 MMOL/L (ref 3.5–5.3)
PR INTERVAL: 150 MS
PR INTERVAL: 150 MS
QRS AXIS: 51 DEGREES
QRS AXIS: 55 DEGREES
QRSD INTERVAL: 84 MS
QRSD INTERVAL: 86 MS
QT INTERVAL: 396 MS
QT INTERVAL: 428 MS
QTC INTERVAL: 481 MS
QTC INTERVAL: 495 MS
RBC # BLD AUTO: 4.73 MILLION/UL (ref 3.88–5.62)
SODIUM SERPL-SCNC: 145 MMOL/L (ref 135–147)
SODIUM SERPL-SCNC: 145 MMOL/L (ref 135–147)
SODIUM SERPL-SCNC: 148 MMOL/L (ref 135–147)
SODIUM SERPL-SCNC: 148 MMOL/L (ref 135–147)
SODIUM SERPL-SCNC: 149 MMOL/L (ref 135–147)
T WAVE AXIS: 43 DEGREES
T WAVE AXIS: 63 DEGREES
VENTRICULAR RATE: 76 BPM
VENTRICULAR RATE: 94 BPM
WBC # BLD AUTO: 10.97 THOUSAND/UL (ref 4.31–10.16)

## 2023-12-26 PROCEDURE — 84100 ASSAY OF PHOSPHORUS: CPT | Performed by: PHYSICIAN ASSISTANT

## 2023-12-26 PROCEDURE — 99232 SBSQ HOSP IP/OBS MODERATE 35: CPT | Performed by: INTERNAL MEDICINE

## 2023-12-26 PROCEDURE — 84100 ASSAY OF PHOSPHORUS: CPT

## 2023-12-26 PROCEDURE — 82330 ASSAY OF CALCIUM: CPT | Performed by: PHYSICIAN ASSISTANT

## 2023-12-26 PROCEDURE — 85025 COMPLETE CBC W/AUTO DIFF WBC: CPT

## 2023-12-26 PROCEDURE — 80048 BASIC METABOLIC PNL TOTAL CA: CPT

## 2023-12-26 PROCEDURE — 82948 REAGENT STRIP/BLOOD GLUCOSE: CPT

## 2023-12-26 PROCEDURE — 93005 ELECTROCARDIOGRAM TRACING: CPT

## 2023-12-26 PROCEDURE — 80048 BASIC METABOLIC PNL TOTAL CA: CPT | Performed by: PHYSICIAN ASSISTANT

## 2023-12-26 PROCEDURE — 83735 ASSAY OF MAGNESIUM: CPT

## 2023-12-26 PROCEDURE — 99291 CRITICAL CARE FIRST HOUR: CPT | Performed by: INTERNAL MEDICINE

## 2023-12-26 PROCEDURE — 83735 ASSAY OF MAGNESIUM: CPT | Performed by: PHYSICIAN ASSISTANT

## 2023-12-26 RX ORDER — POTASSIUM CHLORIDE 14.9 MG/ML
20 INJECTION INTRAVENOUS ONCE
Status: COMPLETED | OUTPATIENT
Start: 2023-12-26 | End: 2023-12-26

## 2023-12-26 RX ORDER — SODIUM CHLORIDE, SODIUM GLUCONATE, SODIUM ACETATE, POTASSIUM CHLORIDE, MAGNESIUM CHLORIDE, SODIUM PHOSPHATE, DIBASIC, AND POTASSIUM PHOSPHATE .53; .5; .37; .037; .03; .012; .00082 G/100ML; G/100ML; G/100ML; G/100ML; G/100ML; G/100ML; G/100ML
500 INJECTION, SOLUTION INTRAVENOUS CONTINUOUS
Status: DISCONTINUED | OUTPATIENT
Start: 2023-12-26 | End: 2023-12-26

## 2023-12-26 RX ORDER — SODIUM CHLORIDE, SODIUM GLUCONATE, SODIUM ACETATE, POTASSIUM CHLORIDE, MAGNESIUM CHLORIDE, SODIUM PHOSPHATE, DIBASIC, AND POTASSIUM PHOSPHATE .53; .5; .37; .037; .03; .012; .00082 G/100ML; G/100ML; G/100ML; G/100ML; G/100ML; G/100ML; G/100ML
250 INJECTION, SOLUTION INTRAVENOUS CONTINUOUS
Status: DISCONTINUED | OUTPATIENT
Start: 2023-12-26 | End: 2023-12-26

## 2023-12-26 RX ORDER — DEXTROSE, SODIUM CHLORIDE, SODIUM LACTATE, POTASSIUM CHLORIDE, AND CALCIUM CHLORIDE 5; .6; .31; .03; .02 G/100ML; G/100ML; G/100ML; G/100ML; G/100ML
250 INJECTION, SOLUTION INTRAVENOUS CONTINUOUS
Status: DISCONTINUED | OUTPATIENT
Start: 2023-12-26 | End: 2023-12-27

## 2023-12-26 RX ORDER — MAGNESIUM HYDROXIDE/ALUMINUM HYDROXICE/SIMETHICONE 120; 1200; 1200 MG/30ML; MG/30ML; MG/30ML
30 SUSPENSION ORAL EVERY 4 HOURS PRN
Status: DISCONTINUED | OUTPATIENT
Start: 2023-12-26 | End: 2023-12-29 | Stop reason: HOSPADM

## 2023-12-26 RX ADMIN — TRIMETHOBENZAMIDE HYDROCHLORIDE 200 MG: 100 INJECTION INTRAMUSCULAR at 20:48

## 2023-12-26 RX ADMIN — TRIMETHOBENZAMIDE HYDROCHLORIDE 200 MG: 100 INJECTION INTRAMUSCULAR at 14:00

## 2023-12-26 RX ADMIN — SODIUM CHLORIDE 3 UNITS/HR: 9 INJECTION, SOLUTION INTRAVENOUS at 16:19

## 2023-12-26 RX ADMIN — CHLORHEXIDINE GLUCONATE 15 ML: 1.2 SOLUTION ORAL at 08:00

## 2023-12-26 RX ADMIN — POTASSIUM CHLORIDE 20 MEQ: 14.9 INJECTION, SOLUTION INTRAVENOUS at 11:19

## 2023-12-26 RX ADMIN — POTASSIUM PHOSPHATE, MONOBASIC POTASSIUM PHOSPHATE, DIBASIC 30 MMOL: 224; 236 INJECTION, SOLUTION, CONCENTRATE INTRAVENOUS at 22:36

## 2023-12-26 RX ADMIN — HEPARIN SODIUM 5000 UNITS: 5000 INJECTION INTRAVENOUS; SUBCUTANEOUS at 05:30

## 2023-12-26 RX ADMIN — CHLORHEXIDINE GLUCONATE 15 ML: 1.2 SOLUTION ORAL at 20:50

## 2023-12-26 RX ADMIN — TRIMETHOBENZAMIDE HYDROCHLORIDE 200 MG: 100 INJECTION INTRAMUSCULAR at 08:00

## 2023-12-26 RX ADMIN — HEPARIN SODIUM 5000 UNITS: 5000 INJECTION INTRAVENOUS; SUBCUTANEOUS at 13:57

## 2023-12-26 RX ADMIN — INSULIN LISPRO 4 UNITS: 100 INJECTION, SOLUTION INTRAVENOUS; SUBCUTANEOUS at 05:29

## 2023-12-26 RX ADMIN — SODIUM CHLORIDE 10.2 UNITS/HR: 9 INJECTION, SOLUTION INTRAVENOUS at 10:25

## 2023-12-26 RX ADMIN — ACETAMINOPHEN 650 MG: 325 TABLET, FILM COATED ORAL at 22:53

## 2023-12-26 RX ADMIN — POTASSIUM CHLORIDE 20 MEQ: 14.9 INJECTION, SOLUTION INTRAVENOUS at 15:40

## 2023-12-26 RX ADMIN — INSULIN LISPRO 10 UNITS: 100 INJECTION, SOLUTION INTRAVENOUS; SUBCUTANEOUS at 07:56

## 2023-12-26 RX ADMIN — DEXTROSE, SODIUM CHLORIDE, SODIUM LACTATE, POTASSIUM CHLORIDE, AND CALCIUM CHLORIDE 250 ML/HR: 5; .6; .31; .03; .02 INJECTION, SOLUTION INTRAVENOUS at 12:45

## 2023-12-26 RX ADMIN — SODIUM CHLORIDE 4 UNITS/HR: 9 INJECTION, SOLUTION INTRAVENOUS at 19:29

## 2023-12-26 RX ADMIN — SODIUM PHOSPHATE, MONOBASIC, MONOHYDRATE AND SODIUM PHOSPHATE, DIBASIC, ANHYDROUS 12 MMOL: 142; 276 INJECTION, SOLUTION INTRAVENOUS at 12:52

## 2023-12-26 RX ADMIN — HEPARIN SODIUM 5000 UNITS: 5000 INJECTION INTRAVENOUS; SUBCUTANEOUS at 22:36

## 2023-12-26 NOTE — PROGRESS NOTES
[facility]  Progress Note: Critical Care  Name: Praful Quiros 58 y.o. male I MRN: [id number]  Unit/Bed#: ICU 12 I Date of Admission: 12/25/2023   Date of Service: 12/26/2023 I Hospital Day: 1    Assessment/Plan    * DKA (diabetic ketoacidosis) (HCC)  Assessment & Plan  - Fatigue, N/V, decreased appetite, polyuria, abdominal pain x 3 days. Also endorsed shortness of breath with tachypnea  - Visiting from [place]. He will be going back first week of January. No prior history of DM  - Initially presented to [facility] on 12/24 and was found to be in DKA. Glucose 665, BHB 6.1, AG 27. ABG showing metabolic acidosis (pH 7.107, pCO2 11.2, HCO3 3.5)  - Given 3 L of NS in [facility] ED. Started on DKA protocol and transferred to [facility]. Also started on bicarbonate drip  - Repeat VBG showing metabolic acidosis with respiratory compensation (pH 7.055, pCO2 22.7, HCO3 6.2)  - CXR (12/24) - wet read, no acute cardiopulmonary abnormality  - CTAP (12/14) - prominent appendix measuring up to 7 mm. Suggestion of trace periappendiceal inflammatory changes. Early/developing appendicitis cannot be excluded. However, patient underwent appendectomy with complications involving colectomy and reversal 8 years ago in [place]  - CXR (12/25) - wet read, no acute cardiopulmonary abnormality  - No longer on insulin drip    Plan:  - SSI   - IM Tigan prn nausea  - Advance diet as tolerated. Per endocrindology, add Humalog 10 units TID with meals when he starts eating  - Replete K, Mg, phosphorous as needed    Elevated serum creatinine  Assessment & Plan  - Cr 1.99 (no established baseline)  - Most recent Cr 0.76  - Resolved    Constipation  Assessment & Plan  - Last bowel movement was 4 days ago    Plan:  - Miralax and Senna once pt is no longer NPO       Disposition: Continue Critical Care     ICU Core Measures     A: Assess, Prevent, and Manage Pain Has pain been assessed? Yes  Need for changes to pain  regimen? No   B: Both SAT/SAT  N/A   C: Choice of Sedation RASS Goal: 0 Alert and Calm  Need for changes to sedation or analgesia regimen? No   D: Delirium CAM-ICU: Negative   E: Early Mobility  Plan for early mobility? Yes   F: Family Engagement Plan for family engagement today? Yes         Prophylaxis:  VTE VTE covered by:  heparin (porcine), Subcutaneous, 5,000 Units at 12/26/23 0530       Stress Ulcer  not ordered           Subjective    HPI/24hr events: Overnight, DKA drip was turned off. Patient continues to endorse nausea and 1 spit up/vomiting episode this morning. No abdominal pain. No chest pain or further hemoptysis.    Review of Systems:       Objective    Last 24 hrs:                        Vitals I/O      Most Recent Min/Max in 24hrs   Temp 99.2 °F (37.3 °C) Temp  Min: 98 °F (36.7 °C)  Max: 99.2 °F (37.3 °C)   Pulse 69 Pulse  Min: 63  Max: 79   Resp (!) 35 Resp  Min: 14  Max: 35   /75 BP  Min: 113/56  Max: 135/75   O2 Sat 95 % SpO2  Min: 95 %  Max: 97 %      Intake/Output Summary (Last 24 hours) at 12/26/2023 0917  Last data filed at 12/26/2023 0745  Gross per 24 hour   Intake 3432.42 ml   Output 3278.76 ml   Net 153.66 ml         Diet Clear Liquid     Invasive Monitoring T/L/D    Invasive Devices       Peripheral Intravenous Line  Duration             Peripheral IV 12/24/23 Left Antecubital 1 day    Peripheral IV 12/24/23 Right Antecubital 1 day                     Physical Exam  Diagnostic Studies      ECG: NSR. HR 60s-70s.  Imaging:      Medications:  Scheduled PRN   chlorhexidine, 15 mL, Mouth/Throat, Q12H MELO  heparin (porcine), 5,000 Units, Subcutaneous, Q8H MELO  insulin lispro, 1-6 Units, Subcutaneous, Q6H MELO  insulin lispro, 10 Units, Subcutaneous, TID With Meals     acetaminophen, 650 mg, Q6H PRN  polyethylene glycol, 17 g, Daily PRN  senna, 1 tablet, HS PRN  trimethobenzamide, 200 mg, Q6H PRN       Continuous          Labs:    CBC    Recent Labs     12/25/23  0609 12/26/23  0557   WBC  12.15* 10.97*   HGB 15.5 14.4   HCT 46.2 40.9    141*     BMP    Recent Labs     12/25/23  1745 12/26/23  0557   SODIUM 148* 145   K 3.5 3.7   * 116*   CO2 18* 14*   AGAP 6 15   BUN 17 15   CREATININE 0.69 0.76   CALCIUM 7.2* 7.5*       Coags    Recent Labs     12/24/23 2152   INR 1.12   PTT 27        Additional Electrolytes  Recent Labs     12/25/23  0220 12/25/23  0609 12/25/23 1745 12/26/23  0557   MG 2.4   < > 2.4 2.4   PHOS 1.3*   < > 1.9* 2.3*   CAIONIZED 1.19  --   --  1.06*    < > = values in this interval not displayed.          Blood Gas    Recent Labs     12/24/23 2202   PHART 7.107*   VPD8IPX 11.2*   PO2ART 109.0   FOD3JNF 3.5*   BEART -23.3   SOURCE Radial, Right     Recent Labs     12/24/23 2202 12/25/23 0220   PHVEN  --  7.055*   JOV9HJQ  --  22.7*   PO2VEN  --  61.2*   YQA2SJA  --  6.2*   BEVEN  --  -22.6   SOURCE Radial, Right  --     LFTs  Recent Labs     12/24/23 2152   ALT 24   AST 9*   ALKPHOS 122*   ALB 4.3   TBILI 0.75       Infectious  Recent Labs     12/24/23 2152   PROCALCITONI 0.15      Lab Results   Component Value Date    BLOODCX No Growth at 24 hrs. 12/25/2023    BLOODCX No Growth at 24 hrs. 12/25/2023      Glucose  Recent Labs     12/25/23  0934 12/25/23  1341 12/25/23  1745 12/26/23  0557   GLUC 204* 111 96 308*         Myriam Harper DO

## 2023-12-26 NOTE — PROGRESS NOTES
"    Inpatient follow-up progress note        Assessment:      Plan:  Suspected Type1 diabetes with hyperglycemia. Differential: KIARA or DKA prone Type 2 diabetes([facility]) A1c was 13%. Goal is 7%. Either case, he will need basal bolus insulin therapy in hospital and at discharge.     He has mild hyperglycemia on IV insulin and dextrose combination. He is NPO except sips. He reports nausea.  May continue IV insulin therapy overnight and consider transition once able to keep food down.  Reasonable to transition to non DKA insulin infusion protocol once serum sodium is normal.    DKA. Agap is closed. Mild free water deficit based on serum Na.      S:  Nausea+. Not eating or drinking meaningfully. Lao speaking only.      O:  Patient seen and examined personally.  /69 (BP Location: Left arm)   Pulse 66   Temp 99 °F (37.2 °C) (Axillary)   Resp 15   Ht 5' 5\" (1.651 m)   Wt 102 kg (224 lb 6.9 oz)   SpO2 97%   BMI 37.35 kg/m²     Physical Exam  Vitals reviewed.   HENT:      Head: Normocephalic.   Eyes:      Pupils: Pupils are equal, round, and reactive to light.   Musculoskeletal:         General: No deformity.   Neurological:      Mental Status: He is alert and oriented to person, place, and time.         Current Facility-Administered Medications   Medication Dose Route Frequency Provider Last Rate    acetaminophen  650 mg Oral Q6H PRN aLxmi Fang PA-C      aluminum-magnesium hydroxide-simethicone  30 mL Oral Q4H PRN Alfredo Saini MD      chlorhexidine  15 mL Mouth/Throat Q12H MELO Fang PA-C      dextrose 5% lactated ringer's  250 mL/hr Intravenous Continuous Alfredo Saini  mL/hr (12/26/23 1245)    heparin (porcine)  5,000 Units Subcutaneous Q8H MELO Fang PA-C      insulin regular (HumuLIN R,NovoLIN R) 1 Units/mL in sodium chloride 0.9 % 100 mL infusion  0.1-30 Units/hr Intravenous Continuous Alfredo Saini MD 10.2 Units/hr (12/26/23 1025)    " polyethylene glycol  17 g Oral Daily PRN Myriam Huffmanel, DO      potassium chloride  20 mEq Intravenous Once TESS Pearce 20 mEq (12/26/23 1540)    potassium-sodium phosphateS  2 tablet Oral Once TESS Pearce      senna  1 tablet Oral HS PRN Myriam Huffmanel, DO      trimethobenzamide  200 mg Intramuscular Q6H PRN Myriam Harper, DO          Lab, Imaging and other studies:   POC Glucose (mg/dl)   Date Value   12/26/2023 216 (H)   12/26/2023 219 (H)   12/26/2023 246 (H)   12/26/2023 225 (H)   12/26/2023 253 (H)   12/26/2023 311 (H)   12/26/2023 287 (H)   12/25/2023 226 (H)   12/25/2023 127   12/25/2023 98

## 2023-12-26 NOTE — PLAN OF CARE
Problem: Prexisting or High Potential for Compromised Skin Integrity  Goal: Skin integrity is maintained or improved  Description: INTERVENTIONS:  - Identify patients at risk for skin breakdown  - Assess and monitor skin integrity  - Assess and monitor nutrition and hydration status  - Monitor labs   - Assess for incontinence   - Turn and reposition patient  - Assist with mobility/ambulation  - Relieve pressure over bony prominences  - Avoid friction and shearing  - Provide appropriate hygiene as needed including keeping skin clean and dry  - Evaluate need for skin moisturizer/barrier cream  - Collaborate with interdisciplinary team   - Patient/family teaching  - Consider wound care consult   12/26/2023 0718 by Evangelina Urena RN  Outcome: Progressing  12/26/2023 0718 by Evangelina Urena RN  Outcome: Progressing     Problem: PAIN - ADULT  Goal: Verbalizes/displays adequate comfort level or baseline comfort level  Description: Interventions:  - Encourage patient to monitor pain and request assistance  - Assess pain using appropriate pain scale  - Administer analgesics based on type and severity of pain and evaluate response  - Implement non-pharmacological measures as appropriate and evaluate response  - Consider cultural and social influences on pain and pain management  - Notify physician/advanced practitioner if interventions unsuccessful or patient reports new pain  Outcome: Progressing     Problem: INFECTION - ADULT  Goal: Absence or prevention of progression during hospitalization  Description: INTERVENTIONS:  - Assess and monitor for signs and symptoms of infection  - Monitor lab/diagnostic results  - Monitor all insertion sites, i.e. indwelling lines, tubes, and drains  - Monitor endotracheal if appropriate and nasal secretions for changes in amount and color  - Meherrin appropriate cooling/warming therapies per order  - Administer medications as ordered  - Instruct and encourage patient and family to use good  hand hygiene technique  - Identify and instruct in appropriate isolation precautions for identified infection/condition  Outcome: Progressing  Goal: Absence of fever/infection during neutropenic period  Description: INTERVENTIONS:  - Monitor WBC    Outcome: Progressing     Problem: SAFETY ADULT  Goal: Patient will remain free of falls  Description: INTERVENTIONS:  - Educate patient/family on patient safety including physical limitations  - Instruct patient to call for assistance with activity   - Consult OT/PT to assist with strengthening/mobility   - Keep Call bell within reach  - Keep bed low and locked with side rails adjusted as appropriate  - Keep care items and personal belongings within reach  - Initiate and maintain comfort rounds  - Make Fall Risk Sign visible to staff  - Offer Toileting every 2 Hours, in advance of need  - Initiate/Maintain bed alarm  - Apply yellow socks and bracelet for high fall risk patients  - Consider moving patient to room near nurses station  Outcome: Progressing  Goal: Maintain or return to baseline ADL function  Description: INTERVENTIONS:  -  Assess patient's ability to carry out ADLs; assess patient's baseline for ADL function and identify physical deficits which impact ability to perform ADLs (bathing, care of mouth/teeth, toileting, grooming, dressing, etc.)  - Assess/evaluate cause of self-care deficits   - Assess range of motion  - Assess patient's mobility; develop plan if impaired  - Assess patient's need for assistive devices and provide as appropriate  - Encourage maximum independence but intervene and supervise when necessary  - Involve family in performance of ADLs  - Assess for home care needs following discharge   - Consider OT consult to assist with ADL evaluation and planning for discharge  - Provide patient education as appropriate  Outcome: Progressing  Goal: Maintains/Returns to pre admission functional level  Description: INTERVENTIONS:  - Perform AM-PAC 6  Click Basic Mobility/ Daily Activity assessment daily.  - Set and communicate daily mobility goal to care team and patient/family/caregiver.   - Collaborate with rehabilitation services on mobility goals if consulted  - Perform Range of Motion 3 times a day.  - Reposition patient every 2 hours.  - Dangle patient 3 times a day  - Stand patient 3 times a day  - Ambulate patient 3 times a day  - Out of bed to chair 3 times a day   - Out of bed for meals 3 times a day  - Out of bed for toileting  - Record patient progress and toleration of activity level   Outcome: Progressing

## 2023-12-26 NOTE — UTILIZATION REVIEW
Initial Clinical Review    Admission: Date/Time/Statement:   Admission Orders (From admission, onward)       Ordered        12/25/23 0159  Inpatient Admission  Once                          Orders Placed This Encounter   Procedures    Inpatient Admission     Standing Status:   Standing     Number of Occurrences:   1     Order Specific Question:   Level of Care     Answer:   Level 1 Stepdown [13]     Order Specific Question:   Estimated length of stay     Answer:   More than 2 Midnights     Order Specific Question:   Certification     Answer:   I certify that inpatient services are medically necessary for this patient for a duration of greater than two midnights. See H&P and MD Progress Notes for additional information about the patient's course of treatment.       Initial Presentation: 58 y.o. male to [facility] from [facility] ED  Present to ED with hyperglycemia and NEERAJ from [facility] ED.  Received 3 L of normal saline in [facility] ED. Patient presented to the emergency department with a complaint of nausea and vomiting with increased respirations and fatigue. Over the last 3 days he began to have decreased oral intake with no bowel movements.  They reported increased urination frequency and chills with increased fatigue.  Yesterday afternoon he began to develop nausea and one episode of non bilious, non bloody emesis as well as increased respiration rate.   PMHX: perforated appendicitis s/p appendectomy with complications involving colectomy and reversal 8 years ago but otherwise no unremarkable medical history   Admitted to ICU with DX: DKA (diabetic ketoacidosis) - NEW DX  on exam: tachy; severe acidosis.   BGL > 500 ; tachypnea  PLAN: cont ivf D2 @ 250; cont insulin gtt; rec'd D50 iv x1; rec'd Mg Sulf iv x1; rec'd KCL iv x8; rec'd Potassium Phos iv x3; Cardiopulmonary monitoring; monitor labs; Q 1 hr BS checks        ED Triage Vitals [12/25/23 0200]   Temperature Pulse Respirations Blood Pressure SpO2    97.6 °F (36.4 °C) 88 22 148/73 99 %      Temp Source Heart Rate Source Patient Position - Orthostatic VS BP Location FiO2 (%)   Oral Monitor Lying Left arm --      Pain Score       No Pain          Wt Readings from Last 1 Encounters:   12/25/23 102 kg (224 lb 6.9 oz)     Additional Vital Signs:   Date/Time Temp Pulse Resp BP MAP (mmHg) SpO2 O2 Device Patient Position - Orthostatic VS   12/26/23 1100 99.4 °F (37.4 °C) -- -- -- -- -- None (Room air) Lying   12/26/23 0700 99.2 °F (37.3 °C) 69 35 Abnormal  135/75 99 95 % None (Room air) Lying   12/25/23 2100 98.7 °F (37.1 °C) 68 17 113/56 79 97 % -- --   12/25/23 2054 -- 71 17 -- -- 97 % -- --   12/25/23 2040 -- 73 18 -- -- 97 % -- --   12/25/23 2000 -- 74 20 132/67 93 96 % -- --   12/25/23 1900 98.6 °F (37 °C) 63 14 128/62 88 97 % None (Room air) Lying   12/25/23 1500 98 °F (36.7 °C) 79 21 123/58 83 96 % -- --   12/25/23 1100 98.9 °F (37.2 °C) -- -- -- -- -- None (Room air) Sitting   12/25/23 0800 -- 79 13 126/64 88 97 % -- --   12/25/23 0700 97.8 °F (36.6 °C) -- -- -- -- -- -- --   12/25/23 0600 -- 79 18 133/67 92 97 % -- --   12/25/23 0500 -- 80 15 128/67 91 98 % -- --   12/25/23 0400 97.7 °F (36.5 °C) 83 15 135/68 93 98 % -- --   12/25/23 0300 -- 85 17 132/65 91 99 % -- --   12/25/23 0200 97.6 °F (36.4 °C) 88 22 148/73 103 99 % None (Room air) Lying       EKG: Normal sinus rhythm  Nonspecific T wave abnormality  Poor R Wave Progression  Prolonged QT  Abnormal ECG  No previous ECGs available      Pertinent Labs/Diagnostic Test Results:   XR chest portable ICU    (Results Pending)     Results from last 7 days   Lab Units 12/24/23 2152   SARS-COV-2  Negative     Results from last 7 days   Lab Units 12/26/23  0557 12/25/23  0609 12/25/23  0220 12/24/23 2156   WBC Thousand/uL 10.97* 12.15* 15.04* 17.73*   HEMOGLOBIN g/dL 14.4 15.5 16.2 18.8*   HEMATOCRIT % 40.9 46.2 47.9 56.0*   PLATELETS Thousands/uL 141* 160 171 243   NEUTROS ABS Thousands/µL 9.27* 10.14*  --   14.98*        Results from last 7 days   Lab Units 12/26/23  1025 12/26/23  0557 12/25/23  1745 12/25/23  1341 12/25/23  0934 12/25/23  0609 12/25/23  0220   SODIUM mmol/L 145 145 148* 148* 146   < > 144   POTASSIUM mmol/L 3.9 3.7 3.5 2.9* 3.4*   < > 3.8   CHLORIDE mmol/L 119* 116* 124* 124* 122*   < > 119*   CO2 mmol/L 14* 14* 18* 18* 16*   < > 6*   ANION GAP mmol/L 12 15 6 6 8   < > 19   BUN mg/dL 16 15 17 20 25   < > 35*   CREATININE mg/dL 0.68 0.76 0.69 0.80 0.92   < > 1.41*   EGFR ml/min/1.73sq m 106 101 105 99 91   < > 54   CALCIUM mg/dL 7.6* 7.5* 7.2* 7.0* 7.5*   < > 7.7*   CALCIUM, IONIZED mmol/L  --  1.06*  --   --   --   --  1.19   MAGNESIUM mg/dL 2.4 2.4 2.4 1.9 2.2   < > 2.4   PHOSPHORUS mg/dL 2.0* 2.3* 1.9* 1.3* 1.2*   < > 1.3*    < > = values in this interval not displayed.     Results from last 7 days   Lab Units 12/24/23  2152   AST U/L 9*   ALT U/L 24   ALK PHOS U/L 122*   TOTAL PROTEIN g/dL 8.9*   ALBUMIN g/dL 4.3   TOTAL BILIRUBIN mg/dL 0.75     Results from last 7 days   Lab Units 12/26/23  1107 12/26/23  1015 12/26/23  0528 12/25/23  2321 12/25/23  2100 12/25/23  2004 12/25/23  1905 12/25/23  1755 12/25/23  1701 12/25/23  1629 12/25/23  1559 12/25/23  1511   POC GLUCOSE mg/dl 253* 311* 287* 226* 127 98 100 99 97 106 68 74     Results from last 7 days   Lab Units 12/26/23  1025 12/26/23  0557 12/25/23  1745 12/25/23  1341 12/25/23  0934 12/25/23  0609 12/25/23  0220 12/25/23  0000 12/24/23  2152   GLUCOSE RANDOM mg/dL 307* 308* 96 111 204* 230* 364* 455* 665*     Results from last 7 days   Lab Units 12/24/23 2152   OSMOLALITY, SERUM mmol/*     Results from last 7 days   Lab Units 12/25/23  0221   HEMOGLOBIN A1C % 12.1*   EAG mg/dl 301     BETA-HYDROXYBUTYRATE   Date Value Ref Range Status   12/24/2023 6.1 (H) <0.6 mmol/L Final      Results from last 7 days   Lab Units 12/24/23 2202   PH ART  7.107*   PCO2 ART mm Hg 11.2*   PO2 ART mm Hg 109.0   HCO3 ART mmol/L 3.5*   BASE EXC ART mmol/L  -23.3   O2 CONTENT ART mL/dL 24.7*   O2 HGB, ARTERIAL % 96.8   ABG SOURCE  Radial, Right     Results from last 7 days   Lab Units 12/25/23  0220   PH ALLYN  7.055*   PCO2 ALLYN mm Hg 22.7*   PO2 ALLYN mm Hg 61.2*   HCO3 ALLYN mmol/L 6.2*   BASE EXC ALLYN mmol/L -22.6   O2 CONTENT ALLYN ml/dL 20.2   O2 HGB, VENOUS % 86.2*        Results from last 7 days   Lab Units 12/25/23  0000 12/24/23  2152   HS TNI 0HR ng/L  --  6   HS TNI 2HR ng/L 5  --    HSTNI D2 ng/L -1  --         Results from last 7 days   Lab Units 12/24/23 2152   PROTIME seconds 14.3   INR  1.12   PTT seconds 27        Results from last 7 days   Lab Units 12/24/23  2152   PROCALCITONIN ng/ml 0.15     Results from last 7 days   Lab Units 12/25/23 0220 12/25/23  0000 12/24/23 2152   LACTIC ACID mmol/L 1.8 2.4* 2.8*        Results from last 7 days   Lab Units 12/24/23  2152   BNP pg/mL 30        Results from last 7 days   Lab Units 12/24/23  2306 12/24/23  2152   OSMOLALITY, SERUM mmol/KG  --  367*   OSMO UR mmol/  --      Results from last 7 days   Lab Units 12/24/23 2306   CLARITY UA  Clear   COLOR UA  Yellow   SPEC GRAV UA  1.025   PH UA  6.0   GLUCOSE UA mg/dl 2+*   KETONES UA mg/dl 80 (3+)*   BLOOD UA  2+*   PROTEIN UA mg/dl 1+*   NITRITE UA  Negative   BILIRUBIN UA  Negative   UROBILINOGEN UA E.U./dl 0.2   LEUKOCYTES UA  Negative   WBC UA /hpf 0-5   RBC UA /hpf 4-10*   BACTERIA UA /hpf Occasional   EPITHELIAL CELLS WET PREP /hpf Occasional   SODIUM UR  49     Results from last 7 days   Lab Units 12/24/23 2152   INFLUENZA A PCR  Negative   INFLUENZA B PCR  Negative   RSV PCR  Negative        Results from last 7 days   Lab Units 12/25/23  0301   BLOOD CULTURE  No Growth at 24 hrs.  No Growth at 24 hrs.           Present on Admission:   Elevated serum creatinine      Admitting Diagnosis: dka    Age/Sex: 58 y.o. male    Admission Orders: SCDs; neuro checks; I/O daily wts; Cardiopulmonary monitoring; NPO    Scheduled Medications:  chlorhexidine, 15 mL,  Mouth/Throat, Q12H MELO  heparin (porcine), 5,000 Units, Subcutaneous, Q8H MELO  potassium chloride, 20 mEq, Intravenous, Once  sodium phosphate, 12 mmol, Intravenous, Once   Followed by  potassium-sodium phosphateS, 2 tablet, Oral, Once    dextrose 50 % IV solution 25 mL  Dose: 25 mL  Freq: Once Route: IV  Start: 12/25/23 1615 End: 12/25/23 1604     magnesium sulfate 2 g/50 mL IVPB (premix) 2 g  Dose: 2 g  Freq: Once Route: IV  Last Dose: 2 g (12/25/23 1411)  Start: 12/25/23 1415 End: 12/25/23 1611     potassium chloride 20 mEq IVPB (premix)  - ( rec'd x8)   Dose: 20 mEq  Freq: Every 1 hour scheduled Route: IV  Start: 12/25/23 1900 End: 12/25/23 2120     potassium phosphates 30 mmol in sodium chloride 0.9 % 250 mL infusion - ( rec'd x3)   Dose: 30 mmol  Freq: Once Route: IV  Last Dose: 30 mmol (12/25/23 1517)  Start: 12/25/23 1415 End: 12/25/23 2117       Continuous IV Infusions:  dextrose 5% lactated ringer's, 250 mL/hr, Intravenous, Continuous  insulin regular (HumuLIN R,NovoLIN R) 1 Units/mL in sodium chloride 0.9 % 100 mL infusion, 0.1-30 Units/hr, Intravenous, Continuous  sodium bicarbonate 150 mEq in dextrose 5 % 1,000 mL infusion  Rate: 125 mL/hr Dose: 125 mL/hr  Freq: Continuous Route: IV  Last Dose: Stopped (12/25/23 1114)  Start: 12/25/23 0330 End: 12/25/23 1105     multi-electrolyte (ISOLYTE-S PH 7.4 equivalent) IV solution  Rate: 500 mL/hr Dose: 500 mL/hr  Freq: Continuous Route: IV  Last Dose: Stopped (12/25/23 0600)  Start: 12/25/23 0200 End: 12/25/23 0620       PRN Meds:  acetaminophen, 650 mg, Oral, Q6H PRN  aluminum-magnesium hydroxide-simethicone, 30 mL, Oral, Q4H PRN  polyethylene glycol, 17 g, Oral, Daily PRN  senna, 1 tablet, Oral, HS PRN  trimethobenzamide, 200 mg, Intramuscular, Q6H PRN        IP CONSULT TO CASE MANAGEMENT  IP CONSULT TO ENDOCRINOLOGY    Network Utilization Review Department  ATTENTION: Please call with any questions or concerns to [phone] and carefully listen to the  prompts so that you are directed to the right person. All voicemails are confidential.   For Discharge needs, contact Care Management DC Support Team at [phone] opt. 2  Send all requests for admission clinical reviews, approved or denied determinations and any other requests to dedicated fax number below belonging to the [place] where the patient is receiving treatment. List of dedicated fax numbers for the Facilities:  FACILITY NAME UR FAX NUMBER   ADMISSION DENIALS (Administrative/Medical Necessity) [phone]   DISCHARGE SUPPORT TEAM (NETWORK) [phone]   PARENT CHILD HEALTH (Maternity/NICU/Pediatrics) [phone]   [facility] [phone]   [facility] [phone]   [facility] [phone]   [facility] [phone]   [facility] [phone]   [facility] [phone]   [facility] [phone]   [facility] [phone]   [facility] [phone]   Count includes the [facility] [phone]   [facility] [phone]

## 2023-12-26 NOTE — CASE MANAGEMENT
Case Management Progress Note    Patient name Praful Quiros  Location ICU 12/ICU 12 MRN [id number]  : 1965 Date 2023       LOS (days): 1  Geometric Mean LOS (GMLOS) (days):   Days to GMLOS:        OBJECTIVE:        Current admission status: Inpatient  Preferred Pharmacy: No Pharmacies Listed  Primary Care Provider: No primary care provider on file.    Primary Insurance:   Secondary Insurance:     PROGRESS NOTE:    Sticky note on chart requested CM f/u with pt's daughter regarding pt's medical insurance.     CM contacted pt's daughter, Sophy, using language line. Interpretor [phone]. Sophy reports pt is visiting from [place]. I staying with her. Pt fully independent and works. As per Sophy pt does have medical insurance that has international coverage. As per Sophy insurance is:     [employer]  Policy [id number]  Certified #[id number]  Phone: [phone]    As per Sophy there is no information for an RX, BIN, or PCN. That the policy number and certified number are what is provided.    CM sent email to financially counselors for insurance verification.

## 2023-12-27 LAB
ANION GAP SERPL CALCULATED.3IONS-SCNC: 3 MMOL/L
ANION GAP SERPL CALCULATED.3IONS-SCNC: 4 MMOL/L
ANION GAP SERPL CALCULATED.3IONS-SCNC: 6 MMOL/L
ANION GAP SERPL CALCULATED.3IONS-SCNC: 7 MMOL/L
ANION GAP SERPL CALCULATED.3IONS-SCNC: 7 MMOL/L
BUN SERPL-MCNC: 10 MG/DL (ref 5–25)
BUN SERPL-MCNC: 10 MG/DL (ref 5–25)
BUN SERPL-MCNC: 11 MG/DL (ref 5–25)
BUN SERPL-MCNC: 7 MG/DL (ref 5–25)
BUN SERPL-MCNC: 8 MG/DL (ref 5–25)
CA-I BLD-SCNC: 1.07 MMOL/L (ref 1.12–1.32)
CALCIUM SERPL-MCNC: 7.5 MG/DL (ref 8.4–10.2)
CALCIUM SERPL-MCNC: 7.6 MG/DL (ref 8.4–10.2)
CALCIUM SERPL-MCNC: 7.6 MG/DL (ref 8.4–10.2)
CALCIUM SERPL-MCNC: 7.7 MG/DL (ref 8.4–10.2)
CALCIUM SERPL-MCNC: 8 MG/DL (ref 8.4–10.2)
CHLORIDE SERPL-SCNC: 109 MMOL/L (ref 96–108)
CHLORIDE SERPL-SCNC: 109 MMOL/L (ref 96–108)
CHLORIDE SERPL-SCNC: 117 MMOL/L (ref 96–108)
CO2 SERPL-SCNC: 25 MMOL/L (ref 21–32)
CO2 SERPL-SCNC: 25 MMOL/L (ref 21–32)
CO2 SERPL-SCNC: 26 MMOL/L (ref 21–32)
CO2 SERPL-SCNC: 27 MMOL/L (ref 21–32)
CO2 SERPL-SCNC: 28 MMOL/L (ref 21–32)
CREAT SERPL-MCNC: 0.49 MG/DL (ref 0.6–1.3)
CREAT SERPL-MCNC: 0.57 MG/DL (ref 0.6–1.3)
CREAT SERPL-MCNC: 0.59 MG/DL (ref 0.6–1.3)
CREAT SERPL-MCNC: 0.61 MG/DL (ref 0.6–1.3)
CREAT SERPL-MCNC: 0.61 MG/DL (ref 0.6–1.3)
ERYTHROCYTE [DISTWIDTH] IN BLOOD BY AUTOMATED COUNT: 14.1 % (ref 11.6–15.1)
GFR SERPL CREATININE-BSD FRML MDRD: 110 ML/MIN/1.73SQ M
GFR SERPL CREATININE-BSD FRML MDRD: 110 ML/MIN/1.73SQ M
GFR SERPL CREATININE-BSD FRML MDRD: 112 ML/MIN/1.73SQ M
GFR SERPL CREATININE-BSD FRML MDRD: 113 ML/MIN/1.73SQ M
GFR SERPL CREATININE-BSD FRML MDRD: 121 ML/MIN/1.73SQ M
GLUCOSE SERPL-MCNC: 118 MG/DL (ref 65–140)
GLUCOSE SERPL-MCNC: 150 MG/DL (ref 65–140)
GLUCOSE SERPL-MCNC: 159 MG/DL (ref 65–140)
GLUCOSE SERPL-MCNC: 160 MG/DL (ref 65–140)
GLUCOSE SERPL-MCNC: 167 MG/DL (ref 65–140)
GLUCOSE SERPL-MCNC: 168 MG/DL (ref 65–140)
GLUCOSE SERPL-MCNC: 180 MG/DL (ref 65–140)
GLUCOSE SERPL-MCNC: 183 MG/DL (ref 65–140)
GLUCOSE SERPL-MCNC: 188 MG/DL (ref 65–140)
GLUCOSE SERPL-MCNC: 195 MG/DL (ref 65–140)
GLUCOSE SERPL-MCNC: 212 MG/DL (ref 65–140)
GLUCOSE SERPL-MCNC: 224 MG/DL (ref 65–140)
GLUCOSE SERPL-MCNC: 226 MG/DL (ref 65–140)
GLUCOSE SERPL-MCNC: 239 MG/DL (ref 65–140)
GLUCOSE SERPL-MCNC: 239 MG/DL (ref 65–140)
GLUCOSE SERPL-MCNC: 436 MG/DL (ref 65–140)
HCT VFR BLD AUTO: 35.7 % (ref 36.5–49.3)
HGB BLD-MCNC: 12.4 G/DL (ref 12–17)
MAGNESIUM SERPL-MCNC: 2 MG/DL (ref 1.9–2.7)
MAGNESIUM SERPL-MCNC: 2.1 MG/DL (ref 1.9–2.7)
MAGNESIUM SERPL-MCNC: 2.3 MG/DL (ref 1.9–2.7)
MCH RBC QN AUTO: 30 PG (ref 26.8–34.3)
MCHC RBC AUTO-ENTMCNC: 34.7 G/DL (ref 31.4–37.4)
MCV RBC AUTO: 86 FL (ref 82–98)
PHOSPHATE SERPL-MCNC: 2.3 MG/DL (ref 2.7–4.5)
PHOSPHATE SERPL-MCNC: 2.4 MG/DL (ref 2.7–4.5)
PHOSPHATE SERPL-MCNC: 2.7 MG/DL (ref 2.7–4.5)
PHOSPHATE SERPL-MCNC: 2.7 MG/DL (ref 2.7–4.5)
PHOSPHATE SERPL-MCNC: 2.9 MG/DL (ref 2.7–4.5)
PLATELET # BLD AUTO: 105 THOUSANDS/UL (ref 149–390)
PMV BLD AUTO: 12.1 FL (ref 8.9–12.7)
POTASSIUM SERPL-SCNC: 3 MMOL/L (ref 3.5–5.3)
POTASSIUM SERPL-SCNC: 3.3 MMOL/L (ref 3.5–5.3)
POTASSIUM SERPL-SCNC: 3.4 MMOL/L (ref 3.5–5.3)
POTASSIUM SERPL-SCNC: 3.5 MMOL/L (ref 3.5–5.3)
POTASSIUM SERPL-SCNC: 4.8 MMOL/L (ref 3.5–5.3)
RBC # BLD AUTO: 4.13 MILLION/UL (ref 3.88–5.62)
SODIUM SERPL-SCNC: 141 MMOL/L (ref 135–147)
SODIUM SERPL-SCNC: 142 MMOL/L (ref 135–147)
SODIUM SERPL-SCNC: 147 MMOL/L (ref 135–147)
SODIUM SERPL-SCNC: 148 MMOL/L (ref 135–147)
SODIUM SERPL-SCNC: 149 MMOL/L (ref 135–147)
WBC # BLD AUTO: 6.61 THOUSAND/UL (ref 4.31–10.16)

## 2023-12-27 PROCEDURE — 99232 SBSQ HOSP IP/OBS MODERATE 35: CPT | Performed by: INTERNAL MEDICINE

## 2023-12-27 PROCEDURE — 84100 ASSAY OF PHOSPHORUS: CPT

## 2023-12-27 PROCEDURE — 82948 REAGENT STRIP/BLOOD GLUCOSE: CPT

## 2023-12-27 PROCEDURE — 85027 COMPLETE CBC AUTOMATED: CPT

## 2023-12-27 PROCEDURE — 83735 ASSAY OF MAGNESIUM: CPT

## 2023-12-27 PROCEDURE — 93005 ELECTROCARDIOGRAM TRACING: CPT

## 2023-12-27 PROCEDURE — 82330 ASSAY OF CALCIUM: CPT

## 2023-12-27 PROCEDURE — 80048 BASIC METABOLIC PNL TOTAL CA: CPT

## 2023-12-27 RX ORDER — POTASSIUM CHLORIDE 14.9 MG/ML
20 INJECTION INTRAVENOUS ONCE
Qty: 100 ML | Refills: 0 | Status: COMPLETED | OUTPATIENT
Start: 2023-12-27 | End: 2023-12-27

## 2023-12-27 RX ORDER — DEXTROSE MONOHYDRATE 50 MG/ML
150 INJECTION, SOLUTION INTRAVENOUS CONTINUOUS
Status: DISCONTINUED | OUTPATIENT
Start: 2023-12-27 | End: 2023-12-27

## 2023-12-27 RX ORDER — DEXTROSE AND SODIUM CHLORIDE 5; .45 G/100ML; G/100ML
250 INJECTION, SOLUTION INTRAVENOUS CONTINUOUS
Status: DISCONTINUED | OUTPATIENT
Start: 2023-12-27 | End: 2023-12-27

## 2023-12-27 RX ORDER — POTASSIUM CHLORIDE 14.9 MG/ML
20 INJECTION INTRAVENOUS ONCE
Status: COMPLETED | OUTPATIENT
Start: 2023-12-27 | End: 2023-12-27

## 2023-12-27 RX ORDER — CALCIUM GLUCONATE 20 MG/ML
1 INJECTION, SOLUTION INTRAVENOUS ONCE
Status: COMPLETED | OUTPATIENT
Start: 2023-12-27 | End: 2023-12-27

## 2023-12-27 RX ORDER — ONDANSETRON 2 MG/ML
4 INJECTION INTRAMUSCULAR; INTRAVENOUS ONCE
Status: COMPLETED | OUTPATIENT
Start: 2023-12-28 | End: 2023-12-28

## 2023-12-27 RX ORDER — POTASSIUM CHLORIDE 20 MEQ/1
40 TABLET, EXTENDED RELEASE ORAL ONCE
Status: COMPLETED | OUTPATIENT
Start: 2023-12-27 | End: 2023-12-27

## 2023-12-27 RX ORDER — DEXTROSE, SODIUM CHLORIDE, SODIUM LACTATE, POTASSIUM CHLORIDE, AND CALCIUM CHLORIDE 5; .6; .31; .03; .02 G/100ML; G/100ML; G/100ML; G/100ML; G/100ML
150 INJECTION, SOLUTION INTRAVENOUS CONTINUOUS
Status: DISCONTINUED | OUTPATIENT
Start: 2023-12-27 | End: 2023-12-27

## 2023-12-27 RX ORDER — SODIUM CHLORIDE, SODIUM LACTATE, POTASSIUM CHLORIDE, CALCIUM CHLORIDE 600; 310; 30; 20 MG/100ML; MG/100ML; MG/100ML; MG/100ML
150 INJECTION, SOLUTION INTRAVENOUS CONTINUOUS
Status: DISCONTINUED | OUTPATIENT
Start: 2023-12-27 | End: 2023-12-28

## 2023-12-27 RX ADMIN — POTASSIUM CHLORIDE 40 MEQ: 1500 TABLET, EXTENDED RELEASE ORAL at 21:16

## 2023-12-27 RX ADMIN — HEPARIN SODIUM 5000 UNITS: 5000 INJECTION INTRAVENOUS; SUBCUTANEOUS at 21:15

## 2023-12-27 RX ADMIN — DEXTROSE AND SODIUM CHLORIDE 250 ML/HR: 5; .45 INJECTION, SOLUTION INTRAVENOUS at 09:08

## 2023-12-27 RX ADMIN — DEXTROSE, SODIUM CHLORIDE, SODIUM LACTATE, POTASSIUM CHLORIDE, AND CALCIUM CHLORIDE 250 ML/HR: 5; .6; .31; .03; .02 INJECTION, SOLUTION INTRAVENOUS at 04:55

## 2023-12-27 RX ADMIN — POTASSIUM CHLORIDE 20 MEQ: 14.9 INJECTION, SOLUTION INTRAVENOUS at 06:34

## 2023-12-27 RX ADMIN — SODIUM CHLORIDE, SODIUM LACTATE, POTASSIUM CHLORIDE, AND CALCIUM CHLORIDE 150 ML/HR: .6; .31; .03; .02 INJECTION, SOLUTION INTRAVENOUS at 20:24

## 2023-12-27 RX ADMIN — POTASSIUM CHLORIDE 20 MEQ: 14.9 INJECTION, SOLUTION INTRAVENOUS at 11:45

## 2023-12-27 RX ADMIN — DEXTROSE 150 ML/HR: 5 SOLUTION INTRAVENOUS at 10:01

## 2023-12-27 RX ADMIN — CHLORHEXIDINE GLUCONATE 15 ML: 1.2 SOLUTION ORAL at 21:15

## 2023-12-27 RX ADMIN — DEXTROSE 150 ML/HR: 5 SOLUTION INTRAVENOUS at 18:58

## 2023-12-27 RX ADMIN — HEPARIN SODIUM 5000 UNITS: 5000 INJECTION INTRAVENOUS; SUBCUTANEOUS at 05:43

## 2023-12-27 RX ADMIN — CALCIUM GLUCONATE 1 G: 20 INJECTION, SOLUTION INTRAVENOUS at 07:52

## 2023-12-27 RX ADMIN — POTASSIUM CHLORIDE 20 MEQ: 14.9 INJECTION, SOLUTION INTRAVENOUS at 21:27

## 2023-12-27 RX ADMIN — TRIMETHOBENZAMIDE HYDROCHLORIDE 200 MG: 100 INJECTION INTRAMUSCULAR at 05:24

## 2023-12-27 RX ADMIN — SODIUM CHLORIDE 11 UNITS/HR: 9 INJECTION, SOLUTION INTRAVENOUS at 11:46

## 2023-12-27 RX ADMIN — POTASSIUM CHLORIDE 20 MEQ: 14.9 INJECTION, SOLUTION INTRAVENOUS at 09:39

## 2023-12-27 RX ADMIN — TRIMETHOBENZAMIDE HYDROCHLORIDE 200 MG: 100 INJECTION INTRAMUSCULAR at 21:36

## 2023-12-27 RX ADMIN — HEPARIN SODIUM 5000 UNITS: 5000 INJECTION INTRAVENOUS; SUBCUTANEOUS at 14:11

## 2023-12-27 RX ADMIN — CHLORHEXIDINE GLUCONATE 15 ML: 1.2 SOLUTION ORAL at 08:02

## 2023-12-27 NOTE — PLAN OF CARE
Problem: Prexisting or High Potential for Compromised Skin Integrity  Goal: Skin integrity is maintained or improved  Description: INTERVENTIONS:  - Identify patients at risk for skin breakdown  - Assess and monitor skin integrity  - Assess and monitor nutrition and hydration status  - Monitor labs   - Assess for incontinence   - Turn and reposition patient  - Assist with mobility/ambulation  - Relieve pressure over bony prominences  - Avoid friction and shearing  - Provide appropriate hygiene as needed including keeping skin clean and dry  - Evaluate need for skin moisturizer/barrier cream  - Collaborate with interdisciplinary team   - Patient/family teaching  - Consider wound care consult   Outcome: Progressing     Problem: PAIN - ADULT  Goal: Verbalizes/displays adequate comfort level or baseline comfort level  Description: Interventions:  - Encourage patient to monitor pain and request assistance  - Assess pain using appropriate pain scale  - Administer analgesics based on type and severity of pain and evaluate response  - Implement non-pharmacological measures as appropriate and evaluate response  - Consider cultural and social influences on pain and pain management  - Notify physician/advanced practitioner if interventions unsuccessful or patient reports new pain  Outcome: Progressing     Problem: INFECTION - ADULT  Goal: Absence or prevention of progression during hospitalization  Description: INTERVENTIONS:  - Assess and monitor for signs and symptoms of infection  - Monitor lab/diagnostic results  - Monitor all insertion sites, i.e. indwelling lines, tubes, and drains  - Monitor endotracheal if appropriate and nasal secretions for changes in amount and color  - [place] appropriate cooling/warming therapies per order  - Administer medications as ordered  - Instruct and encourage patient and family to use good hand hygiene technique  - Identify and instruct in appropriate isolation precautions for  identified infection/condition  Outcome: Progressing  Goal: Absence of fever/infection during neutropenic period  Description: INTERVENTIONS:  - Monitor WBC    Outcome: Progressing     Problem: SAFETY ADULT  Goal: Patient will remain free of falls  Description: INTERVENTIONS:  - Educate patient/family on patient safety including physical limitations  - Instruct patient to call for assistance with activity   - Consult OT/PT to assist with strengthening/mobility   - Keep Call bell within reach  - Keep bed low and locked with side rails adjusted as appropriate  - Keep care items and personal belongings within reach  - Initiate and maintain comfort rounds  - Make Fall Risk Sign visible to staff  - Offer Toileting   - Obtain necessary fall risk management equipment  - Apply yellow socks and bracelet for high fall risk patients  - Consider moving patient to room near nurses station  Outcome: Progressing  Goal: Maintain or return to baseline ADL function  Description: INTERVENTIONS:  -  Assess patient's ability to carry out ADLs; assess patient's baseline for ADL function and identify physical deficits which impact ability to perform ADLs (bathing, care of mouth/teeth, toileting, grooming, dressing, etc.)  - Assess/evaluate cause of self-care deficits   - Assess range of motion  - Assess patient's mobility; develop plan if impaired  - Assess patient's need for assistive devices and provide as appropriate  - Encourage maximum independence but intervene and supervise when necessary  - Involve family in performance of ADLs  - Assess for home care needs following discharge   - Consider OT consult to assist with ADL evaluation and planning for discharge  - Provide patient education as appropriate  Outcome: Progressing  Goal: Maintains/Returns to pre admission functional level  Description: INTERVENTIONS:  - Perform AM-PAC 6 Click Basic Mobility/ Daily Activity assessment daily.  - Set and communicate daily mobility goal to care  team and patient/family/caregiver.   - Collaborate with rehabilitation services on mobility goals if consulted  - Stand patient  times a day  - Out of bed to chair   - Out of bed for meals  - Out of bed for toileting  - Record patient progress and toleration of activity level   Outcome: Progressing     Problem: DISCHARGE PLANNING  Goal: Discharge to home or other facility with appropriate resources  Description: INTERVENTIONS:  - Identify barriers to discharge w/patient and caregiver  - Arrange for needed discharge resources and transportation as appropriate  - Identify discharge learning needs (meds, wound care, etc.)  - Arrange for interpretive services to assist at discharge as needed  - Refer to Case Management Department for coordinating discharge planning if the patient needs post-hospital services based on physician/advanced practitioner order or complex needs related to functional status, cognitive ability, or social support system  Outcome: Progressing     Problem: Knowledge Deficit  Goal: Patient/family/caregiver demonstrates understanding of disease process, treatment plan, medications, and discharge instructions  Description: Complete learning assessment and assess knowledge base.  Interventions:  - Provide teaching at level of understanding  - Provide teaching via preferred learning methods  Outcome: Progressing     Problem: Nutrition/Hydration-ADULT  Goal: Nutrient/Hydration intake appropriate for improving, restoring or maintaining nutritional needs  Description: Monitor and assess patient's nutrition/hydration status for malnutrition. Collaborate with interdisciplinary team and initiate plan and interventions as ordered.  Monitor patient's weight and dietary intake as ordered or per policy. Utilize nutrition screening tool and intervene as necessary. Determine patient's food preferences and provide high-protein, high-caloric foods as appropriate.     INTERVENTIONS:  - Monitor oral intake, urinary  output, labs, and treatment plans  - Assess nutrition and hydration status and recommend course of action  - Evaluate amount of meals eaten  - Assist patient with eating if necessary   - Allow adequate time for meals  - Recommend/ encourage appropriate diets, oral nutritional supplements, and vitamin/mineral supplements  - Order, calculate, and assess calorie counts as needed  - Recommend, monitor, and adjust tube feedings and TPN/PPN based on assessed needs  - Assess need for intravenous fluids  - Provide specific nutrition/hydration education as appropriate  - Include patient/family/caregiver in decisions related to nutrition  Outcome: Progressing

## 2023-12-27 NOTE — QUICK NOTE
Pt was not seen or examined personally. Chart was reviewed.    Plan:  Suspected Type1 diabetes with hyperglycemia. Differential: KIARA or DKA prone Type 2 diabetes([place]'s) A1c was 13%. Goal is 7%. Either case, he will need basal bolus insulin therapy in hospital and at discharge.      He has been transitioned to diet. If he is eating meaningfully, we may consider transition to basal bolus regimen tomorrow.    Suggest continue IV insulin therapy overnight and transition tomorrow.     DKA. Agap is closed. Mild free water deficit based on serum Na.

## 2023-12-27 NOTE — PLAN OF CARE
Problem: Prexisting or High Potential for Compromised Skin Integrity  Goal: Skin integrity is maintained or improved  Description: INTERVENTIONS:  - Identify patients at risk for skin breakdown  - Assess and monitor skin integrity  - Assess and monitor nutrition and hydration status  - Monitor labs   - Assess for incontinence   - Turn and reposition patient  - Assist with mobility/ambulation  - Relieve pressure over bony prominences  - Avoid friction and shearing  - Provide appropriate hygiene as needed including keeping skin clean and dry  - Evaluate need for skin moisturizer/barrier cream  - Collaborate with interdisciplinary team   - Patient/family teaching  - Consider wound care consult   Outcome: Progressing     Problem: PAIN - ADULT  Goal: Verbalizes/displays adequate comfort level or baseline comfort level  Description: Interventions:  - Encourage patient to monitor pain and request assistance  - Assess pain using appropriate pain scale  - Administer analgesics based on type and severity of pain and evaluate response  - Implement non-pharmacological measures as appropriate and evaluate response  - Consider cultural and social influences on pain and pain management  - Notify physician/advanced practitioner if interventions unsuccessful or patient reports new pain  Outcome: Progressing     Problem: INFECTION - ADULT  Goal: Absence or prevention of progression during hospitalization  Description: INTERVENTIONS:  - Assess and monitor for signs and symptoms of infection  - Monitor lab/diagnostic results  - Monitor all insertion sites, i.e. indwelling lines, tubes, and drains  - Monitor endotracheal if appropriate and nasal secretions for changes in amount and color  - [place] appropriate cooling/warming therapies per order  - Administer medications as ordered  - Instruct and encourage patient and family to use good hand hygiene technique  - Identify and instruct in appropriate isolation precautions for  identified infection/condition  Outcome: Progressing  Goal: Absence of fever/infection during neutropenic period  Description: INTERVENTIONS:  - Monitor WBC    Outcome: Progressing     Problem: SAFETY ADULT  Goal: Patient will remain free of falls  Description: INTERVENTIONS:  - Educate patient/family on patient safety including physical limitations  - Instruct patient to call for assistance with activity   - Consult OT/PT to assist with strengthening/mobility   - Keep Call bell within reach  - Keep bed low and locked with side rails adjusted as appropriate  - Keep care items and personal belongings within reach  - Initiate and maintain comfort rounds  - Make Fall Risk Sign visible to staff  - Offer Toileting every  Hours, in advance of need  - Initiate/Maintain alarm  - Obtain necessary fall risk management equipment:   - Apply yellow socks and bracelet for high fall risk patients  - Consider moving patient to room near nurses station  Outcome: Progressing  Goal: Maintain or return to baseline ADL function  Description: INTERVENTIONS:  -  Assess patient's ability to carry out ADLs; assess patient's baseline for ADL function and identify physical deficits which impact ability to perform ADLs (bathing, care of mouth/teeth, toileting, grooming, dressing, etc.)  - Assess/evaluate cause of self-care deficits   - Assess range of motion  - Assess patient's mobility; develop plan if impaired  - Assess patient's need for assistive devices and provide as appropriate  - Encourage maximum independence but intervene and supervise when necessary  - Involve family in performance of ADLs  - Assess for home care needs following discharge   - Consider OT consult to assist with ADL evaluation and planning for discharge  - Provide patient education as appropriate  Outcome: Progressing  Goal: Maintains/Returns to pre admission functional level  Description: INTERVENTIONS:  - Perform AM-PAC 6 Click Basic Mobility/ Daily Activity  assessment daily.  - Set and communicate daily mobility goal to care team and patient/family/caregiver.   - Collaborate with rehabilitation services on mobility goals if consulted  - Perform Range of Motion  times a day.  - Reposition patient every  hours.  - Dangle patient times a day  - Stand patient  times a day  - Ambulate patient  times a day  - Out of bed to chair  times a day   - Out of bed for meals  times a day  - Out of bed for toileting  - Record patient progress and toleration of activity level   Outcome: Progressing     Problem: DISCHARGE PLANNING  Goal: Discharge to home or other facility with appropriate resources  Description: INTERVENTIONS:  - Identify barriers to discharge w/patient and caregiver  - Arrange for needed discharge resources and transportation as appropriate  - Identify discharge learning needs (meds, wound care, etc.)  - Arrange for interpretive services to assist at discharge as needed  - Refer to Case Management Department for coordinating discharge planning if the patient needs post-hospital services based on physician/advanced practitioner order or complex needs related to functional status, cognitive ability, or social support system  Outcome: Progressing     Problem: Knowledge Deficit  Goal: Patient/family/caregiver demonstrates understanding of disease process, treatment plan, medications, and discharge instructions  Description: Complete learning assessment and assess knowledge base.  Interventions:  - Provide teaching at level of understanding  - Provide teaching via preferred learning methods  Outcome: Progressing

## 2023-12-27 NOTE — PROGRESS NOTES
[facility]  Progress Note: Critical Care  Name: Praful Quiros 58 y.o. male I MRN: [id number]  Unit/Bed#: ICU 12 I Date of Admission: 12/25/2023   Date of Service: 12/27/2023 I Hospital Day: 2    Assessment/Plan    * DKA (diabetic ketoacidosis) (HCC)  Assessment & Plan  - Fatigue, N/V, decreased appetite, polyuria, abdominal pain x 3 days. Also endorsed shortness of breath with tachypnea  - Visiting from [place]. He will be going back first week of January. No prior history of DM  - Initially presented to [facility] on 12/24 and was found to be in DKA. Glucose 665, BHB 6.1, AG 27. ABG showing metabolic acidosis (pH 7.107, pCO2 11.2, HCO3 3.5)  - Given 3 L of NS in [facility] ED. Started on DKA protocol and transferred to [facility]. Also started on bicarbonate drip  - Repeat VBG showing metabolic acidosis with respiratory compensation (pH 7.055, pCO2 22.7, HCO3 6.2)  - CXR (12/24) - wet read, no acute cardiopulmonary abnormality  - CTAP (12/14) - prominent appendix measuring up to 7 mm. Suggestion of trace periappendiceal inflammatory changes. Early/developing appendicitis cannot be excluded. However, patient underwent appendectomy with complications involving colectomy and reversal 8 years ago in [place]  - CXR (12/25) - wet read, no acute cardiopulmonary abnormality  - 12/26 - glucose 308, AG 15, open again  - 12/27 - glucose labile 436 > 180. Na 148 (corrected 153)    Plan:  - Continue on insulin drip  - Continue D5 in LR.  Consider switching to D5 in water given hypernatremia  - Endocrine following  - Replete K, Mg, phosphorous as needed    Elevated serum creatinine  Assessment & Plan  - Cr 1.99 (no established baseline)  - Most recent Cr 0.61  - Resolved    Constipation  Assessment & Plan  - Was previously constipated, no bowel movement for 5 days  - Last bowel movement yesterday, 12/26    Plan:  - Bowel regimen with MiraLAX and senna       Disposition: Continue Critical  Care     ICU Core Measures     A: Assess, Prevent, and Manage Pain Has pain been assessed? Yes  Need for changes to pain regimen? No   B: Both SAT/SAT  N/A   C: Choice of Sedation RASS Goal: 0 Alert and Calm  Need for changes to sedation or analgesia regimen? No   D: Delirium CAM-ICU: Negative   E: Early Mobility  Plan for early mobility? Yes   F: Family Engagement Plan for family engagement today? Yes         Prophylaxis:  VTE VTE covered by:  heparin (porcine), Subcutaneous, 5,000 Units at 12/27/23 0543       Stress Ulcer  not ordered           Subjective    HPI/24hr events: Patient was seen and examined at bedside.  He states that he is still nauseous, but wants to try some food.  He denies vomiting, abdominal pain.  He reports having a bowel movement yesterday night.    Review of Systems:       Objective    Last 24 hrs:                        Vitals I/O      Most Recent Min/Max in 24hrs   Temp 99.2 °F (37.3 °C) Temp  Min: 98.3 °F (36.8 °C)  Max: 100.2 °F (37.9 °C)   Pulse 57 Pulse  Min: 57  Max: 98   Resp 15 Resp  Min: 14  Max: 19   /76 BP  Min: 125/65  Max: 162/83   O2 Sat 94 % SpO2  Min: 92 %  Max: 97 %      Intake/Output Summary (Last 24 hours) at 12/27/2023 0801  Last data filed at 12/27/2023 0415  Gross per 24 hour   Intake 972.02 ml   Output 1375 ml   Net -402.98 ml         Diet NPO; Sips with meds     Invasive Monitoring T/L/D    Invasive Devices       Peripheral Intravenous Line  Duration             Peripheral IV 12/24/23 Right Antecubital 2 days    Peripheral IV 12/26/23 Dorsal (posterior);Left Hand <1 day    Peripheral IV 12/26/23 Left Antecubital <1 day                     Physical Exam  Vitals and nursing note reviewed.   Constitutional:       General: He is not in acute distress.     Appearance: He is well-developed. He is obese.   HENT:      Head: Normocephalic and atraumatic.   Eyes:      Conjunctiva/sclera: Conjunctivae normal.   Cardiovascular:      Rate and Rhythm: Normal rate and  regular rhythm.      Heart sounds: No murmur heard.  Pulmonary:      Effort: Pulmonary effort is normal. No respiratory distress.      Breath sounds: Normal breath sounds. No stridor. No wheezing or rales.   Abdominal:      General: Bowel sounds are normal. There is no distension.      Palpations: Abdomen is soft.      Tenderness: There is no abdominal tenderness. There is no guarding.   Musculoskeletal:         General: No swelling.      Cervical back: Neck supple.   Skin:     General: Skin is warm and dry.      Capillary Refill: Capillary refill takes less than 2 seconds.   Neurological:      Mental Status: He is alert.   Psychiatric:         Mood and Affect: Mood normal.       Diagnostic Studies      ECG: NSR. HR 60s-70s.  Imaging:      Medications:  Scheduled PRN   calcium gluconate, 1 g, Intravenous, Once  chlorhexidine, 15 mL, Mouth/Throat, Q12H MELO  heparin (porcine), 5,000 Units, Subcutaneous, Q8H MELO  potassium chloride, 20 mEq, Intravenous, Once  potassium-sodium phosphateS, 2 tablet, Oral, Once     acetaminophen, 650 mg, Q6H PRN  aluminum-magnesium hydroxide-simethicone, 30 mL, Q4H PRN  polyethylene glycol, 17 g, Daily PRN  senna, 1 tablet, HS PRN  trimethobenzamide, 200 mg, Q6H PRN       Continuous    dextrose 5% lactated ringer's, 250 mL/hr, Last Rate: 250 mL/hr (12/27/23 0455)  insulin regular (HumuLIN R,NovoLIN R) 1 Units/mL in sodium chloride 0.9 % 100 mL infusion, 0.3-21 Units/hr, Last Rate: 6 Units/hr (12/27/23 0649)         Labs:    CBC    Recent Labs     12/26/23  0557 12/27/23  0523   WBC 10.97* 6.61   HGB 14.4 12.4   HCT 40.9 35.7*   * 105*     BMP    Recent Labs     12/27/23  0235 12/27/23  0523   SODIUM 149* 148*   K 3.3* 3.4*   * 117*   CO2 25 25   AGAP 7 6   BUN 11 10   CREATININE 0.61 0.61   CALCIUM 7.5* 7.6*       Coags    No recent results     Additional Electrolytes  Recent Labs     12/26/23  0557 12/26/23  1025 12/27/23 0235 12/27/23  0523   MG 2.4   < > 2.3 2.1   PHOS  2.3*   < > 2.7 2.9   CAIONIZED 1.06*  --   --  1.07*    < > = values in this interval not displayed.          Blood Gas    No recent results  No recent results LFTs  No recent results    Infectious  No recent results   Lab Results   Component Value Date    BLOODCX No Growth at 48 hrs. 12/25/2023    BLOODCX No Growth at 48 hrs. 12/25/2023      Glucose  Recent Labs     12/26/23  1751 12/26/23  2235 12/27/23  0235 12/27/23  0523   GLUC 268* 232* 188* 436*         Myriam Harper DO

## 2023-12-27 NOTE — DISCHARGE INSTR - AVS FIRST PAGE
Por caicedo diabetes:   - Por favor keri revisar estas pruebas de laboratorio en [place] con caicedo doctor: anti-RYAN, IA2, ZNT-8, y islet cell   - Usted necesita chequiar caicedo azucar cuatro veces al lizzy, antes de comida y antes de dormir, y traje caicedo maquina donde va a caicedo doctor   - Deberá visitar a un oftalmólogo para que le revise los ojos para detectar los efectos de la diabetes.      Suspected Type1 diabetes with hyperglycemia:   Start Lantus 30 units at bedtime and  QHS and Humalog 10 units 3x/day with meals (breakfast, lunch, and dinner)  Blood sugar goal 180

## 2023-12-27 NOTE — ASSESSMENT & PLAN NOTE
- Was previously constipated, no bowel movement for 5 days  - Last bowel movement 12/26  -No issues- currently resolved    Plan:  - Bowel regimen with MiraLAX and senna as needed

## 2023-12-28 PROBLEM — R79.89 ELEVATED SERUM CREATININE: Status: RESOLVED | Noted: 2023-12-25 | Resolved: 2023-12-28

## 2023-12-28 PROBLEM — K59.00 CONSTIPATION: Status: RESOLVED | Noted: 2023-12-25 | Resolved: 2023-12-28

## 2023-12-28 LAB
ANION GAP SERPL CALCULATED.3IONS-SCNC: 4 MMOL/L
ATRIAL RATE: 56 BPM
BASOPHILS # BLD AUTO: 0.01 THOUSANDS/ÂΜL (ref 0–0.1)
BASOPHILS NFR BLD AUTO: 0 % (ref 0–1)
BUN SERPL-MCNC: 7 MG/DL (ref 5–25)
CALCIUM SERPL-MCNC: 7.3 MG/DL (ref 8.4–10.2)
CHLORIDE SERPL-SCNC: 108 MMOL/L (ref 96–108)
CO2 SERPL-SCNC: 29 MMOL/L (ref 21–32)
CREAT SERPL-MCNC: 0.56 MG/DL (ref 0.6–1.3)
EOSINOPHIL # BLD AUTO: 0.1 THOUSAND/ÂΜL (ref 0–0.61)
EOSINOPHIL NFR BLD AUTO: 2 % (ref 0–6)
ERYTHROCYTE [DISTWIDTH] IN BLOOD BY AUTOMATED COUNT: 13.7 % (ref 11.6–15.1)
GFR SERPL CREATININE-BSD FRML MDRD: 114 ML/MIN/1.73SQ M
GLUCOSE SERPL-MCNC: 111 MG/DL (ref 65–140)
GLUCOSE SERPL-MCNC: 131 MG/DL (ref 65–140)
GLUCOSE SERPL-MCNC: 137 MG/DL (ref 65–140)
GLUCOSE SERPL-MCNC: 144 MG/DL (ref 65–140)
GLUCOSE SERPL-MCNC: 165 MG/DL (ref 65–140)
GLUCOSE SERPL-MCNC: 172 MG/DL (ref 65–140)
GLUCOSE SERPL-MCNC: 187 MG/DL (ref 65–140)
GLUCOSE SERPL-MCNC: 205 MG/DL (ref 65–140)
GLUCOSE SERPL-MCNC: 215 MG/DL (ref 65–140)
GLUCOSE SERPL-MCNC: 95 MG/DL (ref 65–140)
HCT VFR BLD AUTO: 36.6 % (ref 36.5–49.3)
HGB BLD-MCNC: 12.8 G/DL (ref 12–17)
IMM GRANULOCYTES # BLD AUTO: 0.03 THOUSAND/UL (ref 0–0.2)
IMM GRANULOCYTES NFR BLD AUTO: 1 % (ref 0–2)
LYMPHOCYTES # BLD AUTO: 2.15 THOUSANDS/ÂΜL (ref 0.6–4.47)
LYMPHOCYTES NFR BLD AUTO: 33 % (ref 14–44)
MAGNESIUM SERPL-MCNC: 1.8 MG/DL (ref 1.9–2.7)
MCH RBC QN AUTO: 30.3 PG (ref 26.8–34.3)
MCHC RBC AUTO-ENTMCNC: 35 G/DL (ref 31.4–37.4)
MCV RBC AUTO: 87 FL (ref 82–98)
MONOCYTES # BLD AUTO: 0.54 THOUSAND/ÂΜL (ref 0.17–1.22)
MONOCYTES NFR BLD AUTO: 8 % (ref 4–12)
NEUTROPHILS # BLD AUTO: 3.69 THOUSANDS/ÂΜL (ref 1.85–7.62)
NEUTS SEG NFR BLD AUTO: 56 % (ref 43–75)
NRBC BLD AUTO-RTO: 0 /100 WBCS
P AXIS: 57 DEGREES
PHOSPHATE SERPL-MCNC: 2.5 MG/DL (ref 2.7–4.5)
PLATELET # BLD AUTO: 90 THOUSANDS/UL (ref 149–390)
PLATELET BLD QL SMEAR: ABNORMAL
PMV BLD AUTO: 11.6 FL (ref 8.9–12.7)
POTASSIUM SERPL-SCNC: 3.2 MMOL/L (ref 3.5–5.3)
PR INTERVAL: 154 MS
QRS AXIS: 59 DEGREES
QRSD INTERVAL: 90 MS
QT INTERVAL: 510 MS
QTC INTERVAL: 492 MS
RBC # BLD AUTO: 4.23 MILLION/UL (ref 3.88–5.62)
RBC MORPH BLD: NORMAL
SODIUM SERPL-SCNC: 141 MMOL/L (ref 135–147)
T WAVE AXIS: 46 DEGREES
VENTRICULAR RATE: 56 BPM
WBC # BLD AUTO: 6.52 THOUSAND/UL (ref 4.31–10.16)

## 2023-12-28 PROCEDURE — 82948 REAGENT STRIP/BLOOD GLUCOSE: CPT

## 2023-12-28 PROCEDURE — 83519 RIA NONANTIBODY: CPT | Performed by: INTERNAL MEDICINE

## 2023-12-28 PROCEDURE — 99232 SBSQ HOSP IP/OBS MODERATE 35: CPT | Performed by: INTERNAL MEDICINE

## 2023-12-28 PROCEDURE — 86341 ISLET CELL ANTIBODY: CPT | Performed by: INTERNAL MEDICINE

## 2023-12-28 PROCEDURE — 90686 IIV4 VACC NO PRSV 0.5 ML IM: CPT | Performed by: INTERNAL MEDICINE

## 2023-12-28 PROCEDURE — 84100 ASSAY OF PHOSPHORUS: CPT | Performed by: NURSE PRACTITIONER

## 2023-12-28 PROCEDURE — 80048 BASIC METABOLIC PNL TOTAL CA: CPT | Performed by: NURSE PRACTITIONER

## 2023-12-28 PROCEDURE — 90471 IMMUNIZATION ADMIN: CPT | Performed by: INTERNAL MEDICINE

## 2023-12-28 PROCEDURE — 83735 ASSAY OF MAGNESIUM: CPT | Performed by: NURSE PRACTITIONER

## 2023-12-28 PROCEDURE — 85025 COMPLETE CBC W/AUTO DIFF WBC: CPT

## 2023-12-28 RX ORDER — POTASSIUM CHLORIDE 20 MEQ/1
40 TABLET, EXTENDED RELEASE ORAL 2 TIMES DAILY
Status: COMPLETED | OUTPATIENT
Start: 2023-12-28 | End: 2023-12-28

## 2023-12-28 RX ORDER — INSULIN LISPRO 100 [IU]/ML
1-6 INJECTION, SOLUTION INTRAVENOUS; SUBCUTANEOUS
Status: DISCONTINUED | OUTPATIENT
Start: 2023-12-28 | End: 2023-12-29 | Stop reason: HOSPADM

## 2023-12-28 RX ORDER — INSULIN LISPRO 100 [IU]/ML
10 INJECTION, SOLUTION INTRAVENOUS; SUBCUTANEOUS
Qty: 10 ML | Refills: 0 | Status: SHIPPED | OUTPATIENT
Start: 2023-12-28 | End: 2023-12-29

## 2023-12-28 RX ORDER — MAGNESIUM SULFATE HEPTAHYDRATE 40 MG/ML
2 INJECTION, SOLUTION INTRAVENOUS ONCE
Status: COMPLETED | OUTPATIENT
Start: 2023-12-28 | End: 2023-12-28

## 2023-12-28 RX ORDER — INSULIN LISPRO 100 [IU]/ML
10 INJECTION, SOLUTION INTRAVENOUS; SUBCUTANEOUS
Status: DISCONTINUED | OUTPATIENT
Start: 2023-12-28 | End: 2023-12-29 | Stop reason: HOSPADM

## 2023-12-28 RX ORDER — LANCETS 33 GAUGE
EACH MISCELLANEOUS
Qty: 100 EACH | Refills: 0 | Status: SHIPPED | OUTPATIENT
Start: 2023-12-28

## 2023-12-28 RX ORDER — INSULIN GLARGINE 100 [IU]/ML
30 INJECTION, SOLUTION SUBCUTANEOUS
Status: DISCONTINUED | OUTPATIENT
Start: 2023-12-29 | End: 2023-12-28

## 2023-12-28 RX ORDER — GLUCOSAMINE HCL/CHONDROITIN SU 500-400 MG
CAPSULE ORAL
Qty: 100 EACH | Refills: 0 | Status: SHIPPED | OUTPATIENT
Start: 2023-12-28 | End: 2023-12-29

## 2023-12-28 RX ORDER — BLOOD SUGAR DIAGNOSTIC
STRIP MISCELLANEOUS
Qty: 100 EACH | Refills: 0 | Status: SHIPPED | OUTPATIENT
Start: 2023-12-28 | End: 2023-12-29

## 2023-12-28 RX ORDER — PEN NEEDLE, DIABETIC 32GX 5/32"
NEEDLE, DISPOSABLE MISCELLANEOUS
Qty: 100 EACH | Refills: 0 | Status: SHIPPED | OUTPATIENT
Start: 2023-12-28

## 2023-12-28 RX ORDER — INSULIN GLARGINE 100 [IU]/ML
30 INJECTION, SOLUTION SUBCUTANEOUS DAILY
Qty: 10 ML | Refills: 0 | Status: SHIPPED | OUTPATIENT
Start: 2023-12-28 | End: 2023-12-29

## 2023-12-28 RX ORDER — INSULIN GLARGINE 100 [IU]/ML
30 INJECTION, SOLUTION SUBCUTANEOUS
Status: DISCONTINUED | OUTPATIENT
Start: 2023-12-28 | End: 2023-12-29 | Stop reason: HOSPADM

## 2023-12-28 RX ORDER — BLOOD-GLUCOSE METER
KIT MISCELLANEOUS
Qty: 1 KIT | Refills: 0 | Status: SHIPPED | OUTPATIENT
Start: 2023-12-28 | End: 2023-12-29

## 2023-12-28 RX ADMIN — HEPARIN SODIUM 5000 UNITS: 5000 INJECTION INTRAVENOUS; SUBCUTANEOUS at 05:38

## 2023-12-28 RX ADMIN — INSULIN LISPRO 2 UNITS: 100 INJECTION, SOLUTION INTRAVENOUS; SUBCUTANEOUS at 18:04

## 2023-12-28 RX ADMIN — INSULIN GLARGINE 30 UNITS: 100 INJECTION, SOLUTION SUBCUTANEOUS at 11:00

## 2023-12-28 RX ADMIN — POTASSIUM CHLORIDE 40 MEQ: 1500 TABLET, EXTENDED RELEASE ORAL at 08:22

## 2023-12-28 RX ADMIN — INSULIN LISPRO 10 UNITS: 100 INJECTION, SOLUTION INTRAVENOUS; SUBCUTANEOUS at 18:05

## 2023-12-28 RX ADMIN — POTASSIUM CHLORIDE 40 MEQ: 1500 TABLET, EXTENDED RELEASE ORAL at 17:15

## 2023-12-28 RX ADMIN — ONDANSETRON 4 MG: 2 INJECTION INTRAMUSCULAR; INTRAVENOUS at 00:01

## 2023-12-28 RX ADMIN — MAGNESIUM SULFATE HEPTAHYDRATE 2 G: 40 INJECTION, SOLUTION INTRAVENOUS at 07:46

## 2023-12-28 RX ADMIN — HEPARIN SODIUM 5000 UNITS: 5000 INJECTION INTRAVENOUS; SUBCUTANEOUS at 14:33

## 2023-12-28 RX ADMIN — INFLUENZA VIRUS VACCINE 0.5 ML: 15; 15; 15; 15 SUSPENSION INTRAMUSCULAR at 10:59

## 2023-12-28 RX ADMIN — POTASSIUM & SODIUM PHOSPHATES POWDER PACK 280-160-250 MG 1 PACKET: 280-160-250 PACK at 17:15

## 2023-12-28 RX ADMIN — POTASSIUM & SODIUM PHOSPHATES POWDER PACK 280-160-250 MG 1 PACKET: 280-160-250 PACK at 07:48

## 2023-12-28 RX ADMIN — CHLORHEXIDINE GLUCONATE 15 ML: 1.2 SOLUTION ORAL at 21:10

## 2023-12-28 RX ADMIN — HEPARIN SODIUM 5000 UNITS: 5000 INJECTION INTRAVENOUS; SUBCUTANEOUS at 21:09

## 2023-12-28 RX ADMIN — INSULIN LISPRO 1 UNITS: 100 INJECTION, SOLUTION INTRAVENOUS; SUBCUTANEOUS at 21:09

## 2023-12-28 RX ADMIN — CHLORHEXIDINE GLUCONATE 15 ML: 1.2 SOLUTION ORAL at 08:22

## 2023-12-28 RX ADMIN — TRIMETHOBENZAMIDE HYDROCHLORIDE 200 MG: 100 INJECTION INTRAMUSCULAR at 15:59

## 2023-12-28 NOTE — PLAN OF CARE
Problem: Prexisting or High Potential for Compromised Skin Integrity  Goal: Skin integrity is maintained or improved  Description: INTERVENTIONS:  - Identify patients at risk for skin breakdown  - Assess and monitor skin integrity  - Assess and monitor nutrition and hydration status  - Monitor labs   - Assess for incontinence   - Turn and reposition patient  - Assist with mobility/ambulation  - Relieve pressure over bony prominences  - Avoid friction and shearing  - Provide appropriate hygiene as needed including keeping skin clean and dry  - Evaluate need for skin moisturizer/barrier cream  - Collaborate with interdisciplinary team   - Patient/family teaching  - Consider wound care consult   Outcome: Progressing     Problem: PAIN - ADULT  Goal: Verbalizes/displays adequate comfort level or baseline comfort level  Description: Interventions:  - Encourage patient to monitor pain and request assistance  - Assess pain using appropriate pain scale  - Administer analgesics based on type and severity of pain and evaluate response  - Implement non-pharmacological measures as appropriate and evaluate response  - Consider cultural and social influences on pain and pain management  - Notify physician/advanced practitioner if interventions unsuccessful or patient reports new pain  Outcome: Progressing     Problem: INFECTION - ADULT  Goal: Absence or prevention of progression during hospitalization  Description: INTERVENTIONS:  - Assess and monitor for signs and symptoms of infection  - Monitor lab/diagnostic results  - Monitor all insertion sites, i.e. indwelling lines, tubes, and drains  - Monitor endotracheal if appropriate and nasal secretions for changes in amount and color  - [place] appropriate cooling/warming therapies per order  - Administer medications as ordered  - Instruct and encourage patient and family to use good hand hygiene technique  - Identify and instruct in appropriate isolation precautions for  identified infection/condition  Outcome: Progressing  Goal: Absence of fever/infection during neutropenic period  Description: INTERVENTIONS:  - Monitor WBC    Outcome: Progressing     Problem: SAFETY ADULT  Goal: Patient will remain free of falls  Description: INTERVENTIONS:  - Educate patient/family on patient safety including physical limitations  - Instruct patient to call for assistance with activity   - Consult OT/PT to assist with strengthening/mobility   - Keep Call bell within reach  - Keep bed low and locked with side rails adjusted as appropriate  - Keep care items and personal belongings within reach  - Initiate and maintain comfort rounds  - Make Fall Risk Sign visible to staff  - Offer Toileting every  Hours, in advance of need  - Initiate/Maintain alarm  - Obtain necessary fall risk management equipment:   - Apply yellow socks and bracelet for high fall risk patients  - Consider moving patient to room near nurses station  Outcome: Progressing  Goal: Maintain or return to baseline ADL function  Description: INTERVENTIONS:  -  Assess patient's ability to carry out ADLs; assess patient's baseline for ADL function and identify physical deficits which impact ability to perform ADLs (bathing, care of mouth/teeth, toileting, grooming, dressing, etc.)  - Assess/evaluate cause of self-care deficits   - Assess range of motion  - Assess patient's mobility; develop plan if impaired  - Assess patient's need for assistive devices and provide as appropriate  - Encourage maximum independence but intervene and supervise when necessary  - Involve family in performance of ADLs  - Assess for home care needs following discharge   - Consider OT consult to assist with ADL evaluation and planning for discharge  - Provide patient education as appropriate  Outcome: Progressing  Goal: Maintains/Returns to pre admission functional level  Description: INTERVENTIONS:  - Perform AM-PAC 6 Click Basic Mobility/ Daily Activity  assessment daily.  - Set and communicate daily mobility goal to care team and patient/family/caregiver.   - Collaborate with rehabilitation services on mobility goals if consulted  - Perform Range of Motion  times a day.  - Reposition patient every  hours.  - Dangle patient  times a day  - Stand patient  times a day  - Ambulate patient  times a day  - Out of bed to chair  times a day   - Out of bed for meals times a day  - Out of bed for toileting  - Record patient progress and toleration of activity level   Outcome: Progressing     Problem: DISCHARGE PLANNING  Goal: Discharge to home or other facility with appropriate resources  Description: INTERVENTIONS:  - Identify barriers to discharge w/patient and caregiver  - Arrange for needed discharge resources and transportation as appropriate  - Identify discharge learning needs (meds, wound care, etc.)  - Arrange for interpretive services to assist at discharge as needed  - Refer to Case Management Department for coordinating discharge planning if the patient needs post-hospital services based on physician/advanced practitioner order or complex needs related to functional status, cognitive ability, or social support system  Outcome: Progressing     Problem: Knowledge Deficit  Goal: Patient/family/caregiver demonstrates understanding of disease process, treatment plan, medications, and discharge instructions  Description: Complete learning assessment and assess knowledge base.  Interventions:  - Provide teaching at level of understanding  - Provide teaching via preferred learning methods  Outcome: Progressing     Problem: Nutrition/Hydration-ADULT  Goal: Nutrient/Hydration intake appropriate for improving, restoring or maintaining nutritional needs  Description: Monitor and assess patient's nutrition/hydration status for malnutrition. Collaborate with interdisciplinary team and initiate plan and interventions as ordered.  Monitor patient's weight and dietary intake as  ordered or per policy. Utilize nutrition screening tool and intervene as necessary. Determine patient's food preferences and provide high-protein, high-caloric foods as appropriate.     INTERVENTIONS:  - Monitor oral intake, urinary output, labs, and treatment plans  - Assess nutrition and hydration status and recommend course of action  - Evaluate amount of meals eaten  - Assist patient with eating if necessary   - Allow adequate time for meals  - Recommend/ encourage appropriate diets, oral nutritional supplements, and vitamin/mineral supplements  - Order, calculate, and assess calorie counts as needed  - Recommend, monitor, and adjust tube feedings and TPN/PPN based on assessed needs  - Assess need for intravenous fluids  - Provide specific nutrition/hydration education as appropriate  - Include patient/family/caregiver in decisions related to nutrition  Outcome: Progressing

## 2023-12-28 NOTE — PROGRESS NOTES
"    Inpatient follow-up progress note        Assessment:  58 yr [place] male with newly diagnosed diabetes presenting with DKA.    Plan:  Suspected Type1 diabetes with hyperglycemia. Differential: KIARA or DKA prone Type 2 diabetes([place]'s) A1c was 13%. Goal is 7%. Either case, he will need basal bolus insulin therapy in hospital and at discharge.      He seems to have improved significantly last 24 hrs, insulin infusion rate has come down and electrolytes have stabilized.    Eating and drinking now.  Reasonable to transition to Lantus 30u QHS and humalog 10u tidac plus correction algorithm 3.  Titrate to glycemia 100-180mg/dL range.     For discharge, if Lantus solostar is not the preferred basal insulin for the patient's insurance company, please substitute with Tresiba flexpen, Basaglar Kwikpen , Levemir flexpen, Toujeo solostar pen  or equivalent insulin vials at the same dose instead.      For discharge, if Humalog  Kwik pen is not the preferred mealtime insulin for the patient's insurance, please substitute with NovoLog flexpen, Fiasp  flextouch, Admelog solostar  or Apidra solostar pen or equivalent insulin vials at the same dose instead.     If basal/bolus therapy is not covered or affordable, please substitute with Novolin 70/30 Flexpen OR Novolin 70/30 vial at equivalent  25u before breakfast and 20u before dinner dose     Please prescribe insulin pen needles, glucometer, test strips, lancets and insulin syringes (only when using insulin vials) on discharge.          DKA. Seems resolved.      S:  Eating and drinking.      O:  Patient seen and examined personally.  /59   Pulse 64   Temp 98.9 °F (37.2 °C) (Oral)   Resp 16   Ht 5' 5\" (1.651 m)   Wt 112 kg (247 lb 5.7 oz)   SpO2 93%   BMI 41.16 kg/m²     Physical Exam  Vitals reviewed.   HENT:      Head: Normocephalic.   Eyes:      Pupils: Pupils are equal, round, and reactive to light.   Musculoskeletal:         General: No deformity. "   Neurological:      Mental Status: He is alert and oriented to person, place, and time.         Current Facility-Administered Medications   Medication Dose Route Frequency Provider Last Rate    acetaminophen  650 mg Oral Q6H PRN Laxmi Fang PA-C      aluminum-magnesium hydroxide-simethicone  30 mL Oral Q4H PRN Alfredo Saini MD      chlorhexidine  15 mL Mouth/Throat Q12H MELO Fang PA-C      heparin (porcine)  5,000 Units Subcutaneous Q8H [facility] Laxmi Fang PA-C      influenza vaccine  0.5 mL Intramuscular Once Boy Esteban MD      insulin regular (HumuLIN R,NovoLIN R) 1 Units/mL in sodium chloride 0.9 % 100 mL infusion  0.3-21 Units/hr Intravenous Titrated TESS Pear[place] 4 Units/hr (12/28/23 0749)    polyethylene glycol  17 g Oral Daily PRN Myriam Harper, DO      potassium chloride  40 mEq Oral BID Yudith Balasundram, DO      potassium-sodium phosphates  1 packet Oral BID With Meals Yudith Balasundram, DO      senna  1 tablet Oral HS PRN Shreyan Harper, DO      trimethobenzamide  200 mg Intramuscular Q6H PRN Shreyan Harper, DO          Lab, Imaging and other studies:   POC Glucose (mg/dl)   Date Value   12/28/2023 215 (H)   12/28/2023 165 (H)   12/28/2023 95   12/28/2023 131   12/28/2023 144 (H)   12/28/2023 137   12/27/2023 118   12/27/2023 160 (H)   12/27/2023 159 (H)   12/27/2023 183 (H)

## 2023-12-28 NOTE — PLAN OF CARE
Problem: Prexisting or High Potential for Compromised Skin Integrity  Goal: Skin integrity is maintained or improved  Description: INTERVENTIONS:  - Identify patients at risk for skin breakdown  - Assess and monitor skin integrity  - Assess and monitor nutrition and hydration status  - Monitor labs   - Assess for incontinence   - Turn and reposition patient  - Assist with mobility/ambulation  - Relieve pressure over bony prominences  - Avoid friction and shearing  - Provide appropriate hygiene as needed including keeping skin clean and dry  - Evaluate need for skin moisturizer/barrier cream  - Collaborate with interdisciplinary team   - Patient/family teaching  - Consider wound care consult   Outcome: Progressing     Problem: PAIN - ADULT  Goal: Verbalizes/displays adequate comfort level or baseline comfort level  Description: Interventions:  - Encourage patient to monitor pain and request assistance  - Assess pain using appropriate pain scale  - Administer analgesics based on type and severity of pain and evaluate response  - Implement non-pharmacological measures as appropriate and evaluate response  - Consider cultural and social influences on pain and pain management  - Notify physician/advanced practitioner if interventions unsuccessful or patient reports new pain  Outcome: Progressing     Problem: INFECTION - ADULT  Goal: Absence or prevention of progression during hospitalization  Description: INTERVENTIONS:  - Assess and monitor for signs and symptoms of infection  - Monitor lab/diagnostic results  - Monitor all insertion sites, i.e. indwelling lines, tubes, and drains  - Monitor endotracheal if appropriate and nasal secretions for changes in amount and color  - [place] appropriate cooling/warming therapies per order  - Administer medications as ordered  - Instruct and encourage patient and family to use good hand hygiene technique  - Identify and instruct in appropriate isolation precautions for  identified infection/condition  Outcome: Progressing  Goal: Absence of fever/infection during neutropenic period  Description: INTERVENTIONS:  - Monitor WBC    Outcome: Progressing     Problem: SAFETY ADULT  Goal: Patient will remain free of falls  Description: INTERVENTIONS:  - Educate patient/family on patient safety including physical limitations  - Instruct patient to call for assistance with activity   - Consult OT/PT to assist with strengthening/mobility   - Keep Call bell within reach  - Keep bed low and locked with side rails adjusted as appropriate  - Keep care items and personal belongings within reach  - Initiate and maintain comfort rounds  - Make Fall Risk Sign visible to staff  - Offer Toileting every  Hours, in advance of need  - Initiate/Maintain alarm  - Obtain necessary fall risk management equipment:   - Apply yellow socks and bracelet for high fall risk patients  - Consider moving patient to room near nurses station  Outcome: Progressing  Goal: Maintain or return to baseline ADL function  Description: INTERVENTIONS:  -  Assess patient's ability to carry out ADLs; assess patient's baseline for ADL function and identify physical deficits which impact ability to perform ADLs (bathing, care of mouth/teeth, toileting, grooming, dressing, etc.)  - Assess/evaluate cause of self-care deficits   - Assess range of motion  - Assess patient's mobility; develop plan if impaired  - Assess patient's need for assistive devices and provide as appropriate  - Encourage maximum independence but intervene and supervise when necessary  - Involve family in performance of ADLs  - Assess for home care needs following discharge   - Consider OT consult to assist with ADL evaluation and planning for discharge  - Provide patient education as appropriate  Outcome: Progressing  Goal: Maintains/Returns to pre admission functional level  Description: INTERVENTIONS:  - Perform AM-PAC 6 Click Basic Mobility/ Daily Activity  assessment daily.  - Set and communicate daily mobility goal to care team and patient/family/caregiver.   - Collaborate with rehabilitation services on mobility goals if consulted  - Perform Range of Motion  times a day.  - Reposition patient every  hours.  - Dangle patient  times a day  - Stand patient  times a day  - Ambulate patient  times a day  - Out of bed to chair  times a day   - Out of bed for meals times a day  - Out of bed for toileting  - Record patient progress and toleration of activity level   Outcome: Progressing     Problem: DISCHARGE PLANNING  Goal: Discharge to home or other facility with appropriate resources  Description: INTERVENTIONS:  - Identify barriers to discharge w/patient and caregiver  - Arrange for needed discharge resources and transportation as appropriate  - Identify discharge learning needs (meds, wound care, etc.)  - Arrange for interpretive services to assist at discharge as needed  - Refer to Case Management Department for coordinating discharge planning if the patient needs post-hospital services based on physician/advanced practitioner order or complex needs related to functional status, cognitive ability, or social support system  Outcome: Progressing     Problem: Knowledge Deficit  Goal: Patient/family/caregiver demonstrates understanding of disease process, treatment plan, medications, and discharge instructions  Description: Complete learning assessment and assess knowledge base.  Interventions:  - Provide teaching at level of understanding  - Provide teaching via preferred learning methods  Outcome: Progressing     Problem: Nutrition/Hydration-ADULT  Goal: Nutrient/Hydration intake appropriate for improving, restoring or maintaining nutritional needs  Description: Monitor and assess patient's nutrition/hydration status for malnutrition. Collaborate with interdisciplinary team and initiate plan and interventions as ordered.  Monitor patient's weight and dietary intake as  ordered or per policy. Utilize nutrition screening tool and intervene as necessary. Determine patient's food preferences and provide high-protein, high-caloric foods as appropriate.     INTERVENTIONS:  - Monitor oral intake, urinary output, labs, and treatment plans  - Assess nutrition and hydration status and recommend course of action  - Evaluate amount of meals eaten  - Assist patient with eating if necessary   - Allow adequate time for meals  - Recommend/ encourage appropriate diets, oral nutritional supplements, and vitamin/mineral supplements  - Order, calculate, and assess calorie counts as needed  - Recommend, monitor, and adjust tube feedings and TPN/PPN based on assessed needs  - Assess need for intravenous fluids  - Provide specific nutrition/hydration education as appropriate  - Include patient/family/caregiver in decisions related to nutrition  Outcome: Progressing

## 2023-12-28 NOTE — PROGRESS NOTES
[facility]  Progress Note  Name: Praful Quiros I  MRN: [id number]  Unit/Bed#: ICU 12 I Date of Admission: 12/25/2023   Date of Service: 12/28/2023 I Hospital Day: 3    Assessment/Plan   * DKA (diabetic ketoacidosis) (HCC)  Assessment & Plan  - Fatigue, N/V, decreased appetite, polyuria, abdominal pain x 3 days. Also endorsed shortness of breath with tachypnea  - Visiting from [place]. He will be going back first week of January. No prior history of DM  - Initially presented to [facility] on 12/24 and was found to be in DKA.  -Transferred to [facility] for further management  - New onset DM, HgbA1c 12.1% (12/25)    Likely type I with hyperglycemia versus DKA prone type II    Plan:  - Continue on non-DKA insulin gtt-will transition to basal bolus later today (weight based at 0.3 un/kg- 17 units basal, 6 units with meals)  - Stop fluids  - Advance diet to carb level 2  - Endocrine following  - Replete K, Mg, phosphorous as needed    Constipation-resolved as of 12/28/2023  Assessment & Plan  - Was previously constipated, no bowel movement for 5 days  - Last bowel movement 12/26  -No issues- currently resolved    Plan:  - Bowel regimen with MiraLAX and senna as needed    Elevated serum creatinine-resolved as of 12/28/2023  Assessment & Plan  - Cr 1.99 (no established baseline)  - Most recent Cr 0.61  - Resolved             Disposition: Med Surg    ICU Core Measures     A: Assess, Prevent, and Manage Pain Has pain been assessed? Yes  Need for changes to pain regimen? No   B: Both SAT/SAT  N/A   C: Choice of Sedation RASS Goal: 0 Alert and Calm or N/A patient not on sedation  Need for changes to sedation or analgesia regimen? NA   D: Delirium CAM-ICU: Negative   E: Early Mobility  Plan for early mobility? Yes   F: Family Engagement Plan for family engagement today? Yes         Prophylaxis:  VTE VTE covered by:  heparin (porcine), Subcutaneous, 5,000 Units at 12/28/23 0530       Stress  Ulcer  not ordered         Significant 24hr Events     24hr events: No acute overnight events. Patient's fluids were switched to LR, however discontinued this morning due to trace edema in the lower extremities patient was eating well yesterday.  He still states that his appetite is less than his normal, but he is able to eat.  His nausea is improving.  He does he does sometimes have some discomfort when eating which he describes as if his esophageal valve is closing, but it quickly resolves and does not Cause him severe discomfort.    Utilized iPad      Subjective   Review of Systems   Constitutional:  Negative for appetite change, chills and fatigue.   Respiratory:  Negative for chest tightness and shortness of breath.    Cardiovascular:  Positive for leg swelling. Negative for chest pain and palpitations.   Gastrointestinal:  Negative for abdominal pain, constipation, nausea and vomiting.   Genitourinary:  Negative for difficulty urinating and frequency.   Musculoskeletal:  Negative for arthralgias.   Neurological:  Negative for dizziness, weakness, light-headedness and numbness.      Objective                            Vitals I/O      Most Recent Min/Max in 24hrs   Temp 98.9 °F (37.2 °C) Temp  Min: 96.9 °F (36.1 °C)  Max: 98.9 °F (37.2 °C)   Pulse 65 Pulse  Min: 55  Max: 68   Resp 16 Resp  Min: 11  Max: 20   /59 BP  Min: 99/58  Max: 159/83   O2 Sat 93 % SpO2  Min: 91 %  Max: 96 %      Intake/Output Summary (Last 24 hours) at 12/28/2023 0756  Last data filed at 12/28/2023 0626  Gross per 24 hour   Intake 7095.29 ml   Output 1325 ml   Net 5770.29 ml       Diet [place]/CHO Controlled; Consistent Carbohydrate Diet Level 2 (5 carb servings/75 grams CHO/meal)    Invasive Monitoring           Physical Exam   Physical Exam  Eyes:      Extraocular Movements: Extraocular movements intact.      Conjunctiva/sclera: Conjunctivae normal.   Skin:     General: Skin is warm.   HENT:      Head: Normocephalic and  atraumatic.      Mouth/Throat:      Mouth: Mucous membranes are moist.   Cardiovascular:      Rate and Rhythm: Normal rate and regular rhythm.      Pulses: Normal pulses.      Heart sounds: Normal heart sounds.   Abdominal:      Palpations: Abdomen is soft.      Tenderness: There is no abdominal tenderness.   Constitutional:       General: He is not in acute distress.     Appearance: He is well-developed. He is not toxic-appearing.   Pulmonary:      Effort: Pulmonary effort is normal. No accessory muscle usage, respiratory distress or accessory muscle usage.      Breath sounds: Normal breath sounds.   Neurological:      General: No focal deficit present.      Mental Status: He is alert and oriented to person, place and time.      Cranial Nerves: No facial asymmetry.      Motor: No motor deficit.            Diagnostic Studies      EKG: tele reviewed, some bradycardia to 49  Imaging: CXR - no pulm disease I have personally reviewed pertinent reports.   and I have personally reviewed pertinent films in PACS     Medications:  Scheduled PRN   chlorhexidine, 15 mL, Q12H MELO  heparin (porcine), 5,000 Units, Q8H MELO  magnesium sulfate, 2 g, Once  potassium chloride, 40 mEq, BID  potassium-sodium phosphates, 1 packet, BID With Meals      acetaminophen, 650 mg, Q6H PRN  aluminum-magnesium hydroxide-simethicone, 30 mL, Q4H PRN  polyethylene glycol, 17 g, Daily PRN  senna, 1 tablet, HS PRN  trimethobenzamide, 200 mg, Q6H PRN       Continuous    insulin regular (HumuLIN R,NovoLIN R) 1 Units/mL in sodium chloride 0.9 % 100 mL infusion, 0.3-21 Units/hr, Last Rate: 4 Units/hr (12/28/23 0749)         Labs:    CBC    Recent Labs     12/27/23  0523 12/28/23  0536   WBC 6.61 6.52   HGB 12.4 12.8   HCT 35.7* 36.6   * 90*     BMP    Recent Labs     12/27/23  1730 12/28/23  0536   SODIUM 141 141   K 3.0* 3.2*   * 108   CO2 28 29   AGAP 4 4   BUN 7 7   CREATININE 0.49* 0.56*   CALCIUM 7.6* 7.3*       Coags    No recent results      Additional Electrolytes  Recent Labs     12/27/23  0523 12/27/23  0903 12/27/23  1730 12/28/23  0536   MG 2.1   < > 2.0 1.8*   PHOS 2.9   < > 2.7 2.5*   CAIONIZED 1.07*  --   --   --     < > = values in this interval not displayed.          Blood Gas    No recent results  No recent results LFTs  No recent results    Infectious  No recent results  Glucose  Recent Labs     12/27/23  0903 12/27/23  1416 12/27/23  1730 12/28/23  0536   GLUC 239* 224* 167* 111               Please see attending attestation for formal recommendations and critical care time    Yudith Ibrahim, DO

## 2023-12-29 VITALS
HEART RATE: 53 BPM | TEMPERATURE: 98.2 F | WEIGHT: 247.36 LBS | BODY MASS INDEX: 41.21 KG/M2 | SYSTOLIC BLOOD PRESSURE: 110 MMHG | HEIGHT: 65 IN | DIASTOLIC BLOOD PRESSURE: 66 MMHG | RESPIRATION RATE: 21 BRPM | OXYGEN SATURATION: 94 %

## 2023-12-29 PROBLEM — E11.10 DKA (DIABETIC KETOACIDOSIS) (HCC): Status: RESOLVED | Noted: 2023-12-25 | Resolved: 2023-12-29

## 2023-12-29 PROBLEM — E10.9 TYPE 1 DIABETES (HCC): Status: ACTIVE | Noted: 2023-12-29

## 2023-12-29 LAB
ANION GAP SERPL CALCULATED.3IONS-SCNC: 6 MMOL/L
BUN SERPL-MCNC: 11 MG/DL (ref 5–25)
CALCIUM SERPL-MCNC: 7.6 MG/DL (ref 8.4–10.2)
CHLORIDE SERPL-SCNC: 105 MMOL/L (ref 96–108)
CO2 SERPL-SCNC: 26 MMOL/L (ref 21–32)
CREAT SERPL-MCNC: 0.64 MG/DL (ref 0.6–1.3)
ERYTHROCYTE [DISTWIDTH] IN BLOOD BY AUTOMATED COUNT: 13.4 % (ref 11.6–15.1)
GFR SERPL CREATININE-BSD FRML MDRD: 108 ML/MIN/1.73SQ M
GLUCOSE SERPL-MCNC: 171 MG/DL (ref 65–140)
GLUCOSE SERPL-MCNC: 185 MG/DL (ref 65–140)
GLUCOSE SERPL-MCNC: 223 MG/DL (ref 65–140)
GLUCOSE SERPL-MCNC: 230 MG/DL (ref 65–140)
HCT VFR BLD AUTO: 38 % (ref 36.5–49.3)
HGB BLD-MCNC: 13 G/DL (ref 12–17)
MCH RBC QN AUTO: 29.8 PG (ref 26.8–34.3)
MCHC RBC AUTO-ENTMCNC: 34.2 G/DL (ref 31.4–37.4)
MCV RBC AUTO: 87 FL (ref 82–98)
PLATELET # BLD AUTO: 95 THOUSANDS/UL (ref 149–390)
PMV BLD AUTO: 12 FL (ref 8.9–12.7)
POTASSIUM SERPL-SCNC: 3.8 MMOL/L (ref 3.5–5.3)
RBC # BLD AUTO: 4.36 MILLION/UL (ref 3.88–5.62)
SODIUM SERPL-SCNC: 137 MMOL/L (ref 135–147)
WBC # BLD AUTO: 5.83 THOUSAND/UL (ref 4.31–10.16)

## 2023-12-29 PROCEDURE — 99239 HOSP IP/OBS DSCHRG MGMT >30: CPT | Performed by: NURSE PRACTITIONER

## 2023-12-29 PROCEDURE — 85027 COMPLETE CBC AUTOMATED: CPT

## 2023-12-29 PROCEDURE — 82948 REAGENT STRIP/BLOOD GLUCOSE: CPT

## 2023-12-29 PROCEDURE — 80048 BASIC METABOLIC PNL TOTAL CA: CPT

## 2023-12-29 RX ORDER — GLUCOSAMINE HCL/CHONDROITIN SU 500-400 MG
CAPSULE ORAL
Qty: 200 EACH | Refills: 0 | Status: SHIPPED | OUTPATIENT
Start: 2023-12-29

## 2023-12-29 RX ORDER — BLOOD SUGAR DIAGNOSTIC
STRIP MISCELLANEOUS
Qty: 100 EACH | Refills: 0 | Status: SHIPPED | OUTPATIENT
Start: 2023-12-29

## 2023-12-29 RX ORDER — BLOOD-GLUCOSE METER
KIT MISCELLANEOUS
Qty: 1 KIT | Refills: 0 | Status: SHIPPED | OUTPATIENT
Start: 2023-12-29

## 2023-12-29 RX ORDER — LANCETS 33 GAUGE
EACH MISCELLANEOUS
Qty: 200 EACH | Refills: 0 | Status: SHIPPED | OUTPATIENT
Start: 2023-12-29

## 2023-12-29 RX ORDER — INSULIN LISPRO 100 [IU]/ML
10 INJECTION, SOLUTION INTRAVENOUS; SUBCUTANEOUS
Qty: 3 ML | Refills: 1 | Status: SHIPPED | OUTPATIENT
Start: 2023-12-29

## 2023-12-29 RX ORDER — POTASSIUM CHLORIDE 20 MEQ/1
20 TABLET, EXTENDED RELEASE ORAL ONCE
Status: COMPLETED | OUTPATIENT
Start: 2023-12-29 | End: 2023-12-29

## 2023-12-29 RX ORDER — INSULIN LISPRO 100 [IU]/ML
10 INJECTION, SOLUTION INTRAVENOUS; SUBCUTANEOUS
Qty: 3 ML | Refills: 0 | Status: SHIPPED | OUTPATIENT
Start: 2023-12-29 | End: 2023-12-29

## 2023-12-29 RX ORDER — INSULIN GLARGINE 100 [IU]/ML
30 INJECTION, SOLUTION SUBCUTANEOUS
Qty: 10 ML | Refills: 0 | Status: SHIPPED | OUTPATIENT
Start: 2023-12-29

## 2023-12-29 RX ORDER — BLOOD SUGAR DIAGNOSTIC
STRIP MISCELLANEOUS
Qty: 200 EACH | Refills: 0 | Status: SHIPPED | OUTPATIENT
Start: 2023-12-29

## 2023-12-29 RX ADMIN — HEPARIN SODIUM 5000 UNITS: 5000 INJECTION INTRAVENOUS; SUBCUTANEOUS at 05:00

## 2023-12-29 RX ADMIN — POTASSIUM CHLORIDE 20 MEQ: 1500 TABLET, EXTENDED RELEASE ORAL at 09:14

## 2023-12-29 RX ADMIN — INSULIN LISPRO 3 UNITS: 100 INJECTION, SOLUTION INTRAVENOUS; SUBCUTANEOUS at 12:30

## 2023-12-29 RX ADMIN — CHLORHEXIDINE GLUCONATE 15 ML: 1.2 SOLUTION ORAL at 08:17

## 2023-12-29 RX ADMIN — TRIMETHOBENZAMIDE HYDROCHLORIDE 200 MG: 100 INJECTION INTRAMUSCULAR at 05:00

## 2023-12-29 RX ADMIN — INSULIN LISPRO 10 UNITS: 100 INJECTION, SOLUTION INTRAVENOUS; SUBCUTANEOUS at 12:30

## 2023-12-29 RX ADMIN — INSULIN LISPRO 10 UNITS: 100 INJECTION, SOLUTION INTRAVENOUS; SUBCUTANEOUS at 08:18

## 2023-12-29 RX ADMIN — INSULIN LISPRO 1 UNITS: 100 INJECTION, SOLUTION INTRAVENOUS; SUBCUTANEOUS at 08:18

## 2023-12-29 RX ADMIN — INSULIN GLARGINE 30 UNITS: 100 INJECTION, SOLUTION SUBCUTANEOUS at 08:18

## 2023-12-29 NOTE — ASSESSMENT & PLAN NOTE
Lab Results   Component Value Date    HGBA1C 12.1 (H) 12/25/2023       Recent Labs     12/28/23  2109 12/29/23  0723 12/29/23  1226 12/29/23  1229   POCGLU 187* 185* 223* 230*       Blood Sugar Average: Last 72 hrs:  (P) 189.6  Presenting with DKA, admitted to critical care  and treated with insulin gtt  Seen by endocrinology   Insulin gtt discontinued  Started on Lantus 30 units HS and Lispro 10 units TID with meals with lispro insulin sliding scale coverage   Patient eating and drinking well  Family at bedside. Patient desires discharge home.  Case management assisted with arranging Lantus, Lispro, glucometer, and supplies from the [facility]   1 refill provided for Lispro vial  Patient to return to [place] on 1/11 and follow-up with his PCP   All questions answered

## 2023-12-29 NOTE — CASE MANAGEMENT
Case Management Progress Note    Patient name Praful Quiros  Location ICU 12/ICU 12 MRN [id number]  : 1965 Date 2023       LOS (days): 4  Geometric Mean LOS (GMLOS) (days):   Days to GMLOS:        OBJECTIVE:        Current admission status: Inpatient  Preferred Pharmacy:   [facility] [address] [place] [address] [address]  [address] [facility] [address]  Phone: [phone] Fax: [phone]    [facility] [place] - [address]  [address] [place] [address]  Phone: [phone] Fax: [phone]    Primary Care Provider: No primary care provider on file.    Primary Insurance:   Secondary Insurance:     PROGRESS NOTE:    CM notified by SLIM AP that pt is medically stable for dc. Will send insulin to the pharmacy at [facility]. Aware CM will f/u regarding coverage as pt is visiting from [place].     CM notified by SLIM AP That as per [facility] with all supplies the cost will be around $513.     CM emailed financial counselors and pt is 100% eligible for financial assistance. Confirmed with CC Director that medication can be covered for dc. CM provided indigent form to [facility].    CM Spoke with pt daughter, Sophy, and notified her the medication will be covered and ready to be picked up at [facility]. Daughter made aware that pt will need to follow up with PCP In [place] for on going scripts for his insulin.

## 2023-12-29 NOTE — NURSING NOTE
Patient escorted out by daughter. Diabetes teaching given. Prescriptions available for pickup at the [facility] pharmacy.

## 2023-12-29 NOTE — PLAN OF CARE
Problem: Prexisting or High Potential for Compromised Skin Integrity  Goal: Skin integrity is maintained or improved  Description: INTERVENTIONS:  - Identify patients at risk for skin breakdown  - Assess and monitor skin integrity  - Assess and monitor nutrition and hydration status  - Monitor labs   - Assess for incontinence   - Turn and reposition patient  - Assist with mobility/ambulation  - Relieve pressure over bony prominences  - Avoid friction and shearing  - Provide appropriate hygiene as needed including keeping skin clean and dry  - Evaluate need for skin moisturizer/barrier cream  - Collaborate with interdisciplinary team   - Patient/family teaching  - Consider wound care consult   Outcome: Progressing     Problem: PAIN - ADULT  Goal: Verbalizes/displays adequate comfort level or baseline comfort level  Description: Interventions:  - Encourage patient to monitor pain and request assistance  - Assess pain using appropriate pain scale  - Administer analgesics based on type and severity of pain and evaluate response  - Implement non-pharmacological measures as appropriate and evaluate response  - Consider cultural and social influences on pain and pain management  - Notify physician/advanced practitioner if interventions unsuccessful or patient reports new pain  Outcome: Progressing     Problem: INFECTION - ADULT  Goal: Absence or prevention of progression during hospitalization  Description: INTERVENTIONS:  - Assess and monitor for signs and symptoms of infection  - Monitor lab/diagnostic results  - Monitor all insertion sites, i.e. indwelling lines, tubes, and drains  - Monitor endotracheal if appropriate and nasal secretions for changes in amount and color  - [place] appropriate cooling/warming therapies per order  - Administer medications as ordered  - Instruct and encourage patient and family to use good hand hygiene technique  - Identify and instruct in appropriate isolation precautions for  identified infection/condition  Outcome: Progressing  Goal: Absence of fever/infection during neutropenic period  Description: INTERVENTIONS:  - Monitor WBC    Outcome: Progressing     Problem: SAFETY ADULT  Goal: Patient will remain free of falls  Description: INTERVENTIONS:  - Educate patient/family on patient safety including physical limitations  - Instruct patient to call for assistance with activity   - Consult OT/PT to assist with strengthening/mobility   - Keep Call bell within reach  - Keep bed low and locked with side rails adjusted as appropriate  - Keep care items and personal belongings within reach  - Initiate and maintain comfort rounds  - Make Fall Risk Sign visible to staff  - Offer Toileting every 2 Hours, in advance of need  - Initiate/Maintain bed alarm  - Obtain necessary fall risk management equipment  - Apply yellow socks and bracelet for high fall risk patients  - Consider moving patient to room near nurses station  Outcome: Progressing  Goal: Maintain or return to baseline ADL function  Description: INTERVENTIONS:  -  Assess patient's ability to carry out ADLs; assess patient's baseline for ADL function and identify physical deficits which impact ability to perform ADLs (bathing, care of mouth/teeth, toileting, grooming, dressing, etc.)  - Assess/evaluate cause of self-care deficits   - Assess range of motion  - Assess patient's mobility; develop plan if impaired  - Assess patient's need for assistive devices and provide as appropriate  - Encourage maximum independence but intervene and supervise when necessary  - Involve family in performance of ADLs  - Assess for home care needs following discharge   - Consider OT consult to assist with ADL evaluation and planning for discharge  - Provide patient education as appropriate  Outcome: Progressing  Goal: Maintains/Returns to pre admission functional level  Description: INTERVENTIONS:  - Perform AM-PAC 6 Click Basic Mobility/ Daily Activity  assessment daily.  - Set and communicate daily mobility goal to care team and patient/family/caregiver.   - Collaborate with rehabilitation services on mobility goals if consulted  - Perform Range of Motion 3 times a day.  - Reposition patient every 2 hours.  - Dangle patient 3 times a day  - Stand patient 3 times a day  - Ambulate patient 3 times a day  - Out of bed to chair 3 times a day   - Out of bed for meals 3 times a day  - Out of bed for toileting  - Record patient progress and toleration of activity level   Outcome: Progressing     Problem: DISCHARGE PLANNING  Goal: Discharge to home or other facility with appropriate resources  Description: INTERVENTIONS:  - Identify barriers to discharge w/patient and caregiver  - Arrange for needed discharge resources and transportation as appropriate  - Identify discharge learning needs (meds, wound care, etc.)  - Arrange for interpretive services to assist at discharge as needed  - Refer to Case Management Department for coordinating discharge planning if the patient needs post-hospital services based on physician/advanced practitioner order or complex needs related to functional status, cognitive ability, or social support system  Outcome: Progressing     Problem: Knowledge Deficit  Goal: Patient/family/caregiver demonstrates understanding of disease process, treatment plan, medications, and discharge instructions  Description: Complete learning assessment and assess knowledge base.  Interventions:  - Provide teaching at level of understanding  - Provide teaching via preferred learning methods  Outcome: Progressing     Problem: Nutrition/Hydration-ADULT  Goal: Nutrient/Hydration intake appropriate for improving, restoring or maintaining nutritional needs  Description: Monitor and assess patient's nutrition/hydration status for malnutrition. Collaborate with interdisciplinary team and initiate plan and interventions as ordered.  Monitor patient's weight and dietary  intake as ordered or per policy. Utilize nutrition screening tool and intervene as necessary. Determine patient's food preferences and provide high-protein, high-caloric foods as appropriate.     INTERVENTIONS:  - Monitor oral intake, urinary output, labs, and treatment plans  - Assess nutrition and hydration status and recommend course of action  - Evaluate amount of meals eaten  - Assist patient with eating if necessary   - Allow adequate time for meals  - Recommend/ encourage appropriate diets, oral nutritional supplements, and vitamin/mineral supplements  - Order, calculate, and assess calorie counts as needed  - Recommend, monitor, and adjust tube feedings and TPN/PPN based on assessed needs  - Assess need for intravenous fluids  - Provide specific nutrition/hydration education as appropriate  - Include patient/family/caregiver in decisions related to nutrition  Outcome: Progressing

## 2023-12-29 NOTE — DISCHARGE SUMMARY
Formerly [facility]  Discharge- Praful Quiros 1965, 58 y.o. male MRN: [id number]  Unit/Bed#: ICU 12 Encounter: [id number]  Primary Care Provider: No primary care provider on file.   Date and time admitted to hospital: 12/25/2023  1:42 AM    * Type 1 diabetes (HCC)  Assessment & Plan  Lab Results   Component Value Date    HGBA1C 12.1 (H) 12/25/2023       Recent Labs     12/28/23  2109 12/29/23  0723 12/29/23  1226 12/29/23  1229   POCGLU 187* 185* 223* 230*       Blood Sugar Average: Last 72 hrs:  (P) 189.6  Presented with fatigue, N/V, polyuria, abdominal pain, SOB, tachypnea   Visiting from [place], going back 1/11. No prior hst of DM  A1c 12.1  Likely type I with hyperglycemia versus DKA prone type II   Appreciate endocrinology input   Discharging on Lantus 30 units HS and Lispro 10 units TID with meals   Outpatient follow-up with PCP in [place]     DKA (diabetic ketoacidosis) (Formerly [facility])-resolved as of 12/29/2023  Assessment & Plan  Lab Results   Component Value Date    HGBA1C 12.1 (H) 12/25/2023       Recent Labs     12/28/23  2109 12/29/23  0723 12/29/23  1226 12/29/23  1229   POCGLU 187* 185* 223* 230*       Blood Sugar Average: Last 72 hrs:  (P) 189.6  Presenting with DKA, admitted to critical care  and treated with insulin gtt  Seen by endocrinology   Insulin gtt discontinued  Started on Lantus 30 units HS and Lispro 10 units TID with meals with lispro insulin sliding scale coverage   Patient eating and drinking well  Family at bedside. Patient desires discharge home.  Case management assisted with arranging Lantus, Lispro, glucometer, and supplies from the [facility]   1 refill provided for Lispro vial  Patient to return to [place] on 1/11 and follow-up with his PCP   All questions answered           Medical Problems       Resolved Problems  Never Reviewed            Resolved    DKA (diabetic ketoacidosis) ([facility]) 12/29/2023     Resolved by  TESS Rordiguez     Constipation 12/28/2023     Resolved by  Yudith Ibrahim DO        Discharging Physician / Practitioner: TESS Rodriguez  PCP: No primary care provider on file.  Admission Date:   Admission Orders (From admission, onward)       Ordered        12/25/23 0159  Inpatient Admission  Once                          Discharge Date: 12/29/23    Consultations During Hospital Stay:  Critical care  Endocrinology     Procedures Performed:   CXR: unremarkable  CT A/P: Motion-limited study. Prominent appendix is seen measuring up to 7 mm. There is the suggestion of trace periappendiceal inflammatory changes, limited evaluation secondary to significant motion. Early/developing appendicitis cannot be entirely excluded. Right testis incidentally noted in the inguinal canal.    Significant Findings / Test Results:   DKA, likely new onset type 1 DM    Incidental Findings:   Type 1 DM   I reviewed the above mentioned incidental findings with the patient and/or family and they expressed understanding.    Test Results Pending at Discharge (will require follow up):   None     Outpatient Tests Requested:  None    Complications:  None    Reason for Admission: DKA    Hospital Course:   Praful Quiros is a 58 y.o. male patient who originally presented to the hospital on 12/25/2023 due to nausea, vomiting, polyuria, fatigue, abdominal pain, SOB, and tachypnea found to be in DKA with an A1c of 12.1. Patient was admitted to critical care for insulin gtt. He was seen by endocrinology. He was started on Lantus 30 units HS and Lispro 10 units TID with meals. He will continue this regimen upon discharge. He will return to [place] 1/11/24 with close follow-up with his primary provider.     Spoke with daughter at bedside, and I have answered all questions to the best of my ability.      Case management provided the glucometer, supplies, and insulin at no cost to the patient as he does not have insurance coverage.     Please see  "above list of diagnoses and related plan for additional information.     Condition at Discharge: stable    Discharge Day Visit / Exam:   Subjective:  Patient seen and examined at bedside. He is resting oob in chair. He denies HA, dizziness, CP, SOB, N/V/D. He is eating and drinking well. He desires discharge home.   Vitals: Blood Pressure: 110/66 (12/29/23 0700)  Pulse: (!) 53 (12/29/23 0700)  Temperature: 98.2 °F (36.8 °C) (12/29/23 0700)  Temp Source: Oral (12/29/23 0700)  Respirations: 21 (12/29/23 0700)  Height: 5' 5\" (165.1 cm) (12/25/23 0200)  Weight - Scale: 112 kg (247 lb 5.7 oz) (12/29/23 0533)  SpO2: 94 % (12/29/23 0700)  Exam:   Physical Exam  Vitals and nursing note reviewed.   Constitutional:       General: He is not in acute distress.     Appearance: He is well-developed.   HENT:      Head: Normocephalic and atraumatic.   Eyes:      Conjunctiva/sclera: Conjunctivae normal.   Cardiovascular:      Rate and Rhythm: Normal rate and regular rhythm.      Heart sounds: No murmur heard.  Pulmonary:      Effort: Pulmonary effort is normal. No respiratory distress.      Breath sounds: Normal breath sounds.   Abdominal:      Palpations: Abdomen is soft.      Tenderness: There is no abdominal tenderness.   Musculoskeletal:         General: No swelling.      Cervical back: Neck supple.   Skin:     General: Skin is warm and dry.      Capillary Refill: Capillary refill takes less than 2 seconds.   Neurological:      Mental Status: He is alert.   Psychiatric:         Mood and Affect: Mood normal.          Discussion with Family: Updated  (daughter) at bedside.    Discharge instructions/Information to patient and family:   See after visit summary for information provided to patient and family.      Provisions for Follow-Up Care:  See after visit summary for information related to follow-up care and any pertinent home health orders.      Mobility at time of Discharge:   Basic Mobility Inpatient Raw Score: " 24  JH-HLM Goal: 8: Walk 250 feet or more  JH-HLM Achieved: 7: Walk 25 feet or more  HLM Goal achieved. Continue to encourage appropriate mobility.     Disposition:   Home    Planned Readmission: none     Discharge Statement:  I spent  minutes discharging the patient. This time was spent on the day of discharge. I had direct contact with the patient on the day of discharge. Greater than 50% of the total time was spent examining patient, answering all patient questions, arranging and discussing plan of care with patient as well as directly providing post-discharge instructions.  Additional time then spent on discharge activities.    Discharge Medications:  See after visit summary for reconciled discharge medications provided to patient and/or family.      **Please Note: This note may have been constructed using a voice recognition system**

## 2023-12-29 NOTE — ASSESSMENT & PLAN NOTE
Lab Results   Component Value Date    HGBA1C 12.1 (H) 12/25/2023       Recent Labs     12/28/23  2109 12/29/23  0723 12/29/23  1226 12/29/23  1229   POCGLU 187* 185* 223* 230*       Blood Sugar Average: Last 72 hrs:  (P) 189.6  Presented with fatigue, N/V, polyuria, abdominal pain, SOB, tachypnea   Visiting from [place], going back 1/11. No prior hst of DM  A1c 12.1  Likely type I with hyperglycemia versus DKA prone type II   Appreciate endocrinology input   Discharging on Lantus 30 units HS and Lispro 10 units TID with meals   Outpatient follow-up with PCP in [place]

## 2023-12-30 LAB
BACTERIA BLD CULT: NORMAL
BACTERIA BLD CULT: NORMAL

## 2024-01-01 LAB — GAD65 AB SER-ACNC: <5 U/ML (ref 0–5)

## 2024-01-02 LAB — PANC ISLET CELL AB TITR SER: NEGATIVE {TITER}

## 2024-01-05 LAB — ISLET CELL512 AB SER-ACNC: 10 U/ML

## 2024-03-13 NOTE — ASSESSMENT & PLAN NOTE
- Fatigue, N/V, decreased appetite, polyuria, abdominal pain x 3 days. Also endorsed shortness of breath with tachypnea  - Visiting from [place]. He will be going back first week of January. No prior history of DM  - Initially presented to [facility] on 12/24 and was found to be in DKA.  -Transferred to [facility] for further management  - New onset DM, HgbA1c 12.1% (12/25)    Likely type I with hyperglycemia versus DKA prone type II    Plan:  - Continue on non-DKA insulin gtt-will transition to basal bolus later today (weight based at 0.3 un/kg- 17 units basal, 6 units with meals)  - Stop fluids  - Advance diet to carb level 2  - Endocrine following  - Replete K, Mg, phosphorous as needed   Detail Level: Detailed